# Patient Record
Sex: FEMALE | Race: WHITE
[De-identification: names, ages, dates, MRNs, and addresses within clinical notes are randomized per-mention and may not be internally consistent; named-entity substitution may affect disease eponyms.]

---

## 2017-06-06 ENCOUNTER — HOSPITAL ENCOUNTER (EMERGENCY)
Dept: HOSPITAL 26 - MED | Age: 77
Discharge: HOME | End: 2017-06-06
Payer: COMMERCIAL

## 2017-06-06 VITALS — DIASTOLIC BLOOD PRESSURE: 58 MMHG | SYSTOLIC BLOOD PRESSURE: 158 MMHG

## 2017-06-06 VITALS — DIASTOLIC BLOOD PRESSURE: 75 MMHG | SYSTOLIC BLOOD PRESSURE: 155 MMHG

## 2017-06-06 VITALS — WEIGHT: 125 LBS | BODY MASS INDEX: 24.54 KG/M2 | HEIGHT: 60 IN

## 2017-06-06 DIAGNOSIS — I10: ICD-10-CM
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
## 2017-06-06 DIAGNOSIS — S81.852A: Primary | ICD-10-CM
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
## 2017-06-06 DIAGNOSIS — M19.90: ICD-10-CM
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
## 2017-06-06 NOTE — NUR
PT PRESENTS TO ER FOR EVALUATION OF DOG BITE TO LEFT CALF. HX HTN, 
HYPERLIPIDEMIA, ARTHRITIS.DENIES N/V/D; SKIN IS PINK/WARM/DRY; AAOX4 WITH EVEN 
AND STEADY GAIT; LUNGS CLEAR BL; HR EVEN AND REGULAR; PT DENIES ANY FEVER, CP, 
SOB, OR COUGH AT THIS TIME; PATIENT STATES PAIN OF 7/10 AT THIS TIME;PATIENT 
POSITIONED FOR COMFORT; HOB ELEVATED; BEDRAILS UP X2; BED DOWN.

## 2017-06-06 NOTE — NUR
Patient discharged with v/s stable. Written and verbal after care instructions 
given and explained.Patient alert, oriented and verbalized understanding of 
instructions. Ambulatory with steady gait. All questions addressed prior to 
discharge. ID band removed. Patient advised to follow up with PMD. Rx of 
AUGMENTIN given. Patient educated on indication of medication including 
possible reaction and side effects. Opportunity to ask questions provided and 
answered.ADVISED PT TO MONITOR FOR INFXN E.G INCREASING PAIN,REDNESS/DISCAHRGES 
COMING OUT FROM WOUND.

## 2018-07-28 ENCOUNTER — HOSPITAL ENCOUNTER (INPATIENT)
Dept: HOSPITAL 26 - MED | Age: 78
LOS: 3 days | Discharge: HOME | DRG: 74 | End: 2018-07-31
Attending: FAMILY MEDICINE | Admitting: FAMILY MEDICINE
Payer: COMMERCIAL

## 2018-07-28 VITALS — WEIGHT: 140 LBS | BODY MASS INDEX: 23.9 KG/M2 | HEIGHT: 64 IN

## 2018-07-28 VITALS — SYSTOLIC BLOOD PRESSURE: 122 MMHG | DIASTOLIC BLOOD PRESSURE: 52 MMHG

## 2018-07-28 VITALS — DIASTOLIC BLOOD PRESSURE: 62 MMHG | SYSTOLIC BLOOD PRESSURE: 147 MMHG

## 2018-07-28 VITALS — DIASTOLIC BLOOD PRESSURE: 71 MMHG | SYSTOLIC BLOOD PRESSURE: 178 MMHG
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
## 2018-07-28 DIAGNOSIS — Z90.710: ICD-10-CM

## 2018-07-28 DIAGNOSIS — Z79.899: ICD-10-CM
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
## 2018-07-28 DIAGNOSIS — I11.0: ICD-10-CM

## 2018-07-28 DIAGNOSIS — Z83.3: ICD-10-CM

## 2018-07-28 DIAGNOSIS — K59.09: ICD-10-CM

## 2018-07-28 DIAGNOSIS — Z82.49: ICD-10-CM

## 2018-07-28 DIAGNOSIS — K21.9: ICD-10-CM

## 2018-07-28 DIAGNOSIS — Z93.3: ICD-10-CM

## 2018-07-28 DIAGNOSIS — N39.0: ICD-10-CM

## 2018-07-28 DIAGNOSIS — K72.90: ICD-10-CM

## 2018-07-28 DIAGNOSIS — R32: ICD-10-CM

## 2018-07-28 DIAGNOSIS — K80.20: ICD-10-CM
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
## 2018-07-28 DIAGNOSIS — F32.9: ICD-10-CM
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
## 2018-07-28 DIAGNOSIS — I50.30: ICD-10-CM

## 2018-07-28 DIAGNOSIS — E86.0: ICD-10-CM

## 2018-07-28 DIAGNOSIS — E78.5: ICD-10-CM

## 2018-07-28 DIAGNOSIS — E87.8: ICD-10-CM

## 2018-07-28 DIAGNOSIS — I48.91: ICD-10-CM

## 2018-07-28 DIAGNOSIS — F43.22: ICD-10-CM

## 2018-07-28 DIAGNOSIS — G90.9: Primary | ICD-10-CM

## 2018-07-28 DIAGNOSIS — M19.90: ICD-10-CM

## 2018-07-28 DIAGNOSIS — K59.00: ICD-10-CM

## 2018-07-28 LAB
ALBUMIN FLD-MCNC: 4.3 G/DL (ref 3.4–5)
AMYLASE SERPL-CCNC: 44 U/L (ref 25–115)
ANION GAP SERPL CALCULATED.3IONS-SCNC: 11.8 MMOL/L (ref 8–16)
APPEARANCE UR: CLEAR
AST SERPL-CCNC: 15 U/L (ref 15–37)
BARBITURATES UR QL SCN: (no result) NG/ML
BASOPHILS # BLD AUTO: 0 K/UL (ref 0–0.22)
BASOPHILS NFR BLD AUTO: 0.5 % (ref 0–2)
BENZODIAZ UR QL SCN: (no result) NG/ML
BILIRUB SERPL-MCNC: 0.4 MG/DL (ref 0–1)
BILIRUB UR QL STRIP: NEGATIVE
BUN SERPL-MCNC: 12 MG/DL (ref 7–18)
BZE UR QL SCN: (no result) NG/ML
CANNABINOIDS UR QL SCN: (no result) NG/ML
CHLORIDE SERPL-SCNC: 108 MMOL/L (ref 98–107)
CO2 SERPL-SCNC: 25 MMOL/L (ref 21–32)
COLOR UR: YELLOW
CREAT SERPL-MCNC: 0.9 MG/DL (ref 0.6–1.3)
EOSINOPHIL # BLD AUTO: 0.1 K/UL (ref 0–0.4)
EOSINOPHIL NFR BLD AUTO: 2.3 % (ref 0–4)
ERYTHROCYTE [DISTWIDTH] IN BLOOD BY AUTOMATED COUNT: 14.1 % (ref 11.6–13.7)
GFR SERPL CREATININE-BSD FRML MDRD: (no result) ML/MIN (ref 90–?)
GLUCOSE SERPL-MCNC: 101 MG/DL (ref 74–106)
GLUCOSE UR STRIP-MCNC: NEGATIVE MG/DL
HCT VFR BLD AUTO: 40 % (ref 36–48)
HGB BLD-MCNC: 13.2 G/DL (ref 12–16)
HGB UR QL STRIP: NEGATIVE
LEUKOCYTE ESTERASE UR QL STRIP: (no result)
LIPASE SERPL-CCNC: 79 U/L (ref 73–393)
LYMPHOCYTES # BLD AUTO: 2.4 K/UL (ref 2.5–16.5)
LYMPHOCYTES NFR BLD AUTO: 43.5 % (ref 20.5–51.1)
MAGNESIUM SERPL-MCNC: 2.1 MG/DL (ref 1.8–2.4)
MCH RBC QN AUTO: 30 PG (ref 27–31)
MCHC RBC AUTO-ENTMCNC: 33 G/DL (ref 33–37)
MCV RBC AUTO: 89.7 FL (ref 80–94)
MONOCYTES # BLD AUTO: 0.2 K/UL (ref 0.8–1)
MONOCYTES NFR BLD AUTO: 4.5 % (ref 1.7–9.3)
NEUTROPHILS # BLD AUTO: 2.7 K/UL (ref 1.8–7.7)
NEUTROPHILS NFR BLD AUTO: 49.2 % (ref 42.2–75.2)
NITRITE UR QL STRIP: POSITIVE
OPIATES UR QL SCN: (no result) NG/ML
PCP UR QL SCN: (no result) NG/ML
PH UR STRIP: 6 [PH] (ref 5–9)
PHOSPHATE SERPL-MCNC: 3.4 MG/DL (ref 2.5–4.9)
PLATELET # BLD AUTO: 228 K/UL (ref 140–450)
POTASSIUM SERPL-SCNC: 3.8 MMOL/L (ref 3.5–5.1)
PROTHROMBIN TIME: 10.5 SECS (ref 10.8–13.4)
RBC # BLD AUTO: 4.46 MIL/UL (ref 4.2–5.4)
RBC #/AREA URNS HPF: (no result) /HPF (ref 0–5)
SODIUM SERPL-SCNC: 141 MMOL/L (ref 136–145)
TSH SERPL DL<=0.05 MIU/L-ACNC: 1.17 UIU/ML (ref 0.34–3.74)
WBC # BLD AUTO: 5.5 K/UL (ref 4.8–10.8)

## 2018-07-28 RX ADMIN — Medication SCH MG: at 21:00

## 2018-07-28 RX ADMIN — SODIUM CHLORIDE SCH MLS/HR: 9 INJECTION, SOLUTION INTRAVENOUS at 11:55

## 2018-07-28 RX ADMIN — TOLTERODINE TARTRATE SCH MG: 4 CAPSULE, EXTENDED RELEASE ORAL at 21:01

## 2018-07-28 RX ADMIN — Medication SCH EACH: at 17:17

## 2018-07-28 NOTE — NUR
RECEIVED REPORT FROM ER NURSE. PT ADMITTED WITH DX OF DIZZINESS. PT AOX4. East Timorese SPEAKING 
ONLY. HAS NO SKIN ISSUE. PT HAS THE COLOSTOMY BAG ON HER LLQ. PT IS AMBULATORY. NO SIGN OF 
DISTRESS NOTED. VS NOTED AS T 98.3, HR 62, /62, RR 16, O2 SAT 94:. DAUGHTER AT THE 
BEDSIDE. WILL CONTINUE TO MONITOR PT.

## 2018-07-28 NOTE — NUR
C/O ROOM SPINNING UPON RISING OR SUDDEN MOVEMENTS WITH NAUSEA-----

RIGHT TEMPORAL HEADACHE----DENIES INJURY, FULL CLEAR SPEECH, AMBULATORY WITH 
STEADY GAIT, NO FACIAL ASYMMETRY, TONGUE MIDLINE, NO DRIFT NOTED. DENIES N/V/D; 
SKIN IS PINK/WARM/DRY; AAOX4 WITH EVEN AND STEADY GAIT; LUNGS CLEAR BL; HR EVEN 
AND REGULAR; PT DENIES ANY FEVER, CP AT THIS TIME; PATIENT STATES PAIN OF 3/10 
AT THIS TIME;hooked pt on monitor shows SB-SR, /74, ROOM AIR, O2 SATS 98% 
PATIENT POSITIONED FOR COMFORT; HOB ELEVATED; BEDRAILS UP X2; BED DOWN. 
DAUGHTER AT BEDSIDE. ER MD AT BEDSIDE TO ASSESS PT.

## 2018-07-28 NOTE — NUR
CHECKED ON PT. PT LYING ON BED. INFUSING IVF WELL. NO SIGN OF DISTRESS. DAUGHTER AT THE 
BEDSIDE. WILL CONTINUE TO MONITOR PT.

## 2018-07-28 NOTE — NUR
RECEIVED REPORT FROM DAY SHIFT RN FOR CONTINUITY OF CARE. PT IS A/OX4, ON ROOM AIR, French 
SPEAKING. PT IS ABLE TO MAKE NEEDS KNOWN, ABLE TO FOLLOW COMMANDS. RESPIRATIONS EVEN AND 
UNLABORED AT THE MOMENT. PT SKIN IS INTACT. PT HAS 20G IV TO LEFT AC, ASYMPTOMATIC, INTACT 
AND PATENT. DISCUSSED PLAN OF CARE WITH PT, PT VERBALIZED UNDERSTANDING. VITAL SIGNS WITHIN 
NORMAL LIMITS. PT STABLE, NO SIGNS OF DISTRESS NOTED AT THIS TIME. BED IN LOWEST POSITION, 
BED ALARM ON. CALL LIGHT WITHIN REACH, WILL CONTINUE TO MONITOR.

## 2018-07-28 NOTE — NUR
RECEIVED REPORT FROM ER NURSE YUMIKO. PT STABLE AT THIS TIME. VS SIGN NOTED AS T 98.3, BP 
147/62, O2 SAT 94%, HR 62, RR 16. DENIES PAIN AT THIS TIME. MRSA NARES SPECIMEN COLLECTED 
AND SENT IT TO LAB. DAUGHTER AT BEDSIDE. PT Georgian SPEAKING ONLY. ADMITTED WITH DX OF 
DIZZINESS. NO SIGN OF DISTRESS NOTED. PT IS AMBULATORY. HAS COLOSTOMY BAG AT [facility]. WILL 
CONTINUE OT MONITOR THE PT.

## 2018-07-28 NOTE — NUR
pt transferred to [facility] 121 b by landy, report given to florecita means. pt ambulated 
to bed from [facility].

## 2018-07-28 NOTE — NUR
PT WITH DR LEONARDO, PSYCHOLOGIST. DAUGHTER TA THE BEDSIDE. DOING PSYCH EVALUATION ON THE PT. 
ADMINISTERED MEDS AS ORDERED TO PT.NO SIGN OF DISTRESS. WILL  CONTINUE TO MONITOR PT.

## 2018-07-29 VITALS — DIASTOLIC BLOOD PRESSURE: 51 MMHG | SYSTOLIC BLOOD PRESSURE: 126 MMHG

## 2018-07-29 VITALS — SYSTOLIC BLOOD PRESSURE: 145 MMHG | DIASTOLIC BLOOD PRESSURE: 57 MMHG

## 2018-07-29 VITALS — DIASTOLIC BLOOD PRESSURE: 57 MMHG | SYSTOLIC BLOOD PRESSURE: 120 MMHG

## 2018-07-29 VITALS — SYSTOLIC BLOOD PRESSURE: 126 MMHG | DIASTOLIC BLOOD PRESSURE: 55 MMHG

## 2018-07-29 VITALS — SYSTOLIC BLOOD PRESSURE: 127 MMHG | DIASTOLIC BLOOD PRESSURE: 57 MMHG

## 2018-07-29 VITALS — DIASTOLIC BLOOD PRESSURE: 45 MMHG | SYSTOLIC BLOOD PRESSURE: 134 MMHG

## 2018-07-29 LAB
ALBUMIN FLD-MCNC: 4.5 G/DL (ref 3.4–5)
ANION GAP SERPL CALCULATED.3IONS-SCNC: 11 MMOL/L (ref 8–16)
AST SERPL-CCNC: 21 U/L (ref 15–37)
BASOPHILS # BLD AUTO: 0 K/UL (ref 0–0.22)
BASOPHILS NFR BLD AUTO: 0.5 % (ref 0–2)
BILIRUB DIRECT SERPL-MCNC: 0.1 MG/DL (ref 0–0.3)
BILIRUB SERPL-MCNC: 0.4 MG/DL (ref 0–1)
BUN SERPL-MCNC: 14 MG/DL (ref 7–18)
CHLORIDE SERPL-SCNC: 109 MMOL/L (ref 98–107)
CHOLEST/HDLC SERPL: 4.7 {RATIO} (ref 1–4.5)
CO2 SERPL-SCNC: 28.4 MMOL/L (ref 21–32)
CREAT SERPL-MCNC: 0.9 MG/DL (ref 0.6–1.3)
EOSINOPHIL # BLD AUTO: 0.2 K/UL (ref 0–0.4)
EOSINOPHIL NFR BLD AUTO: 3.4 % (ref 0–4)
ERYTHROCYTE [DISTWIDTH] IN BLOOD BY AUTOMATED COUNT: 13.8 % (ref 11.6–13.7)
GFR SERPL CREATININE-BSD FRML MDRD: (no result) ML/MIN (ref 90–?)
GLUCOSE SERPL-MCNC: 92 MG/DL (ref 74–106)
HCT VFR BLD AUTO: 37.9 % (ref 36–48)
HDLC SERPL-MCNC: 42 MG/DL (ref 40–60)
HGB BLD-MCNC: 12.6 G/DL (ref 12–16)
LDLC SERPL CALC-MCNC: 115 MG/DL (ref 60–100)
LYMPHOCYTES # BLD AUTO: 2.4 K/UL (ref 2.5–16.5)
LYMPHOCYTES NFR BLD AUTO: 43.9 % (ref 20.5–51.1)
MAGNESIUM SERPL-MCNC: 2.1 MG/DL (ref 1.8–2.4)
MCH RBC QN AUTO: 30 PG (ref 27–31)
MCHC RBC AUTO-ENTMCNC: 33 G/DL (ref 33–37)
MCV RBC AUTO: 90.3 FL (ref 80–94)
MONOCYTES # BLD AUTO: 0.3 K/UL (ref 0.8–1)
MONOCYTES NFR BLD AUTO: 6.2 % (ref 1.7–9.3)
NEUTROPHILS # BLD AUTO: 2.6 K/UL (ref 1.8–7.7)
NEUTROPHILS NFR BLD AUTO: 46 % (ref 42.2–75.2)
PHOSPHATE SERPL-MCNC: 4 MG/DL (ref 2.5–4.9)
PLATELET # BLD AUTO: 216 K/UL (ref 140–450)
POTASSIUM SERPL-SCNC: 4.4 MMOL/L (ref 3.5–5.1)
RBC # BLD AUTO: 4.2 MIL/UL (ref 4.2–5.4)
SODIUM SERPL-SCNC: 144 MMOL/L (ref 136–145)
T4 SERPL-MCNC: 8.2 UG/DL (ref 4.5–12)
TRIGL SERPL-MCNC: 214 MG/DL (ref 30–150)
WBC # BLD AUTO: 5.6 K/UL (ref 4.8–10.8)

## 2018-07-29 RX ADMIN — OXYCODONE HYDROCHLORIDE AND ACETAMINOPHEN SCH MG: 500 TABLET ORAL at 08:51

## 2018-07-29 RX ADMIN — Medication SCH MG: at 08:52

## 2018-07-29 RX ADMIN — Medication SCH EACH: at 08:51

## 2018-07-29 RX ADMIN — Medication SCH MG: at 20:44

## 2018-07-29 RX ADMIN — SODIUM CHLORIDE SCH MLS/HR: 9 INJECTION, SOLUTION INTRAVENOUS at 04:35

## 2018-07-29 RX ADMIN — TOLTERODINE TARTRATE SCH MG: 4 CAPSULE, EXTENDED RELEASE ORAL at 20:45

## 2018-07-29 RX ADMIN — SODIUM CHLORIDE SCH MLS/HR: 9 INJECTION, SOLUTION INTRAVENOUS at 21:15

## 2018-07-29 RX ADMIN — PANTOPRAZOLE SODIUM SCH MG: 40 TABLET, DELAYED RELEASE ORAL at 08:52

## 2018-07-29 RX ADMIN — Medication SCH TAB: at 08:55

## 2018-07-29 NOTE — NUR
CHECKED ON PT. PT SLEEPING. FAMILY MEMBERS WITH THE PT. DENIES ANY PAIN, ANY DIZZINESS. NO 
SIGN OF DISTRESS. CALL LIGHT WITHIN REACH. WILL CONTINUE TO MONITOR PT.

## 2018-07-29 NOTE — NUR
ADMINISTERED MEDS TO PT AS ORDERED. PT TOLERATED WELL. DAUGHTER AT THE BEDSIDE. STATES THAT 
PT FELT DIZZY WHILE GOING TO RESTROOM. PT HAD BREAKFAST . PT ORTHOSTATIC VS BEING TAKEN. 
LYING POSITION 138/51, SITTING 124/55 AND STANDING /61. PT STATES NO DIZINESS AT ALL 
POSITION. PT IN STABLE CONDITION. WILL CONTINUE TO MONITOR PT.

## 2018-07-29 NOTE — NUR
CHECKED ON PT. VS NORMAL NOTED. PT WITH FAMILY, EATING LUNCH. NO SIGN OF DISTRESS NOTED. 
DENIES PAIN. CALL LIGHT WITHIN PT REACH. INSTRUCTED TO USE CALL LIGHT FOR ANY NEED. 
VERBALIZED UNDERSTANDING. WILL CONTINUE TO MONITOR PT.

## 2018-07-29 NOTE — NUR
CHECKED ON PT. SITTING ON HER BED. TALKING WITH FAMILY. NO SIGN OF DISTRESS. WILL CONTINUE 
TO MONITOR PT.

## 2018-07-29 NOTE — NUR
CHECKED ON PT. RECORDED HER VS. FAMILY AT THE BEDSIDE. NO SIGN OF DISTRESS.DENIES ANY 
DIZZINESS, ANY PAIN. CALL LIGHT WITHIN PT REACH. BED AT LOWER POSITION. WILL CONTINUE TO 
MONITOR PT.

## 2018-07-29 NOTE — NUR
RECEIVED PATIENT AWAKE LYING ON BED  ACCOMPANIED BY HER DAUGHTER. PATIENT WAS AA0X4, 
AMBULATORY, Bahamian SPEAKING PATIENT. EXPLAINED THE PLAN OF CARE WITH THE HELP OF HER 
DAUGHTER AND VERBALIZED UNDERSTANDING. BED IN LOW LOCKED POSITION. CALL LIGHT WITHIN REACH.

## 2018-07-29 NOTE — NUR
SEEN PATIENT AWAKE LYING ON BED. MEDICATION GIVEN AND TOLERATED WELL. ALL NEEDS ATTENDED. NO 
S/S OF DISTRESS NOTED AT THIS TIME. CALL LIGHT WITHIN REACH. WILL CONTINUE TO MONITOR.

## 2018-07-30 VITALS — DIASTOLIC BLOOD PRESSURE: 46 MMHG | SYSTOLIC BLOOD PRESSURE: 127 MMHG

## 2018-07-30 VITALS — DIASTOLIC BLOOD PRESSURE: 55 MMHG | SYSTOLIC BLOOD PRESSURE: 139 MMHG

## 2018-07-30 VITALS — DIASTOLIC BLOOD PRESSURE: 53 MMHG | SYSTOLIC BLOOD PRESSURE: 126 MMHG

## 2018-07-30 VITALS — SYSTOLIC BLOOD PRESSURE: 131 MMHG | DIASTOLIC BLOOD PRESSURE: 52 MMHG

## 2018-07-30 VITALS — SYSTOLIC BLOOD PRESSURE: 128 MMHG | DIASTOLIC BLOOD PRESSURE: 51 MMHG

## 2018-07-30 VITALS — DIASTOLIC BLOOD PRESSURE: 39 MMHG | SYSTOLIC BLOOD PRESSURE: 115 MMHG

## 2018-07-30 LAB
ANION GAP SERPL CALCULATED.3IONS-SCNC: 14.8 MMOL/L (ref 8–16)
BASOPHILS # BLD AUTO: 0.1 K/UL (ref 0–0.22)
BASOPHILS NFR BLD AUTO: 0.9 % (ref 0–2)
BUN SERPL-MCNC: 12 MG/DL (ref 7–18)
CHLORIDE SERPL-SCNC: 105 MMOL/L (ref 98–107)
CO2 SERPL-SCNC: 25.2 MMOL/L (ref 21–32)
CREAT SERPL-MCNC: 0.9 MG/DL (ref 0.6–1.3)
EOSINOPHIL # BLD AUTO: 0.3 K/UL (ref 0–0.4)
EOSINOPHIL NFR BLD AUTO: 4.8 % (ref 0–4)
ERYTHROCYTE [DISTWIDTH] IN BLOOD BY AUTOMATED COUNT: 14 % (ref 11.6–13.7)
GFR SERPL CREATININE-BSD FRML MDRD: (no result) ML/MIN (ref 90–?)
GLUCOSE SERPL-MCNC: 90 MG/DL (ref 74–106)
HCT VFR BLD AUTO: 37.9 % (ref 36–48)
HGB BLD-MCNC: 12.7 G/DL (ref 12–16)
LYMPHOCYTES # BLD AUTO: 2.3 K/UL (ref 2.5–16.5)
LYMPHOCYTES NFR BLD AUTO: 40.7 % (ref 20.5–51.1)
MAGNESIUM SERPL-MCNC: 2 MG/DL (ref 1.8–2.4)
MCH RBC QN AUTO: 30 PG (ref 27–31)
MCHC RBC AUTO-ENTMCNC: 33 G/DL (ref 33–37)
MCV RBC AUTO: 89.3 FL (ref 80–94)
MONOCYTES # BLD AUTO: 0.3 K/UL (ref 0.8–1)
MONOCYTES NFR BLD AUTO: 5.8 % (ref 1.7–9.3)
NEUTROPHILS # BLD AUTO: 2.7 K/UL (ref 1.8–7.7)
NEUTROPHILS NFR BLD AUTO: 47.8 % (ref 42.2–75.2)
PHOSPHATE SERPL-MCNC: 3.7 MG/DL (ref 2.5–4.9)
PLATELET # BLD AUTO: 220 K/UL (ref 140–450)
POTASSIUM SERPL-SCNC: 4 MMOL/L (ref 3.5–5.1)
RBC # BLD AUTO: 4.24 MIL/UL (ref 4.2–5.4)
SODIUM SERPL-SCNC: 141 MMOL/L (ref 136–145)
WBC # BLD AUTO: 5.7 K/UL (ref 4.8–10.8)

## 2018-07-30 RX ADMIN — Medication SCH EACH: at 09:29

## 2018-07-30 RX ADMIN — PANTOPRAZOLE SODIUM SCH MG: 40 TABLET, DELAYED RELEASE ORAL at 09:30

## 2018-07-30 RX ADMIN — Medication SCH TAB: at 09:30

## 2018-07-30 RX ADMIN — SODIUM CHLORIDE SCH MLS/HR: 9 INJECTION, SOLUTION INTRAVENOUS at 13:55

## 2018-07-30 RX ADMIN — Medication SCH MG: at 21:10

## 2018-07-30 RX ADMIN — Medication SCH MG: at 09:32

## 2018-07-30 RX ADMIN — TOLTERODINE TARTRATE SCH MG: 4 CAPSULE, EXTENDED RELEASE ORAL at 21:09

## 2018-07-30 RX ADMIN — ESCITALOPRAM OXALATE SCH MG: 20 TABLET, FILM COATED ORAL at 09:00

## 2018-07-30 RX ADMIN — OXYCODONE HYDROCHLORIDE AND ACETAMINOPHEN SCH MG: 500 TABLET ORAL at 09:30

## 2018-07-30 NOTE — NUR
RECEIVED PATIENT REPORT AT BEDSIDE. PATIENT AWAKE, ALERT AND ORIENTED. NO S/S OF DISTRESS. 
PATIENT ON ROOM AIR. PATIENT REPORTS OF CONSTIPATION. COLOSTOMY BAG IN PLACE. ABD FIRM AND 
DISTENDED. BOWEL SOUNDS NOTED. WILL ADMINISTER PRN MEDICATION. BED LOWERED WITH CALL LIGHT 
WITHIN REACH. WILL CONTINUE TO MONITOR

## 2018-07-30 NOTE — NUR
RECEIVED BEDSIDE REPORT FROM AYDE HAUSER, PT IN STABLE CONDITION , NO DISTRESS NOTED, CALL 
LIGHT WITHIN REACH, WILL CONTINUE TO MONITOR.

## 2018-07-30 NOTE — NUR
PATIENT AWAKE IN BED. GRAND DAUGHTER AT BEDSIDE. PATIENT REPORTS HAVING A BM AND DENIES ANY 
DISCOMFORT AT THIS TIME. WILL CONTINUE TO MONITOR

## 2018-07-30 NOTE — NUR
V/S TAKEN AND RECORDED. V/S WITHIN NORMAL RANGE. PATIENT BACK TO SLEEP . BED IN LOW LOCKED 
POSITION. CALL LIGHT WITHIN REACH. WILL CONTINUE TO MONITOR.

## 2018-07-30 NOTE — NUR
ENDORSED PT TO DAY SHIFT NURSE WILFRIDO RN, CALL LIGHT WITHIN REACH, PT IN STABLE CONDITION, 
NO DISTRESS NOTED.

## 2018-07-30 NOTE — NUR
PATIENT HAS BEEN SCREENED AND CATEGORIZED AS MODERATE NUTRITION RISK. PATIENT WILL BE SEEN 
WITHIN 3-5 DAYS OF ADMISSION.



7/30/18 - 8/1/18



[address]IS [address] RD

## 2018-07-30 NOTE — NUR
RECEIVED PT FROM WILFRIDO RN PT Maltese SPEAKER AAOX4 AMBULATORY ON TELEMETRY SR HL ON LEFT 
FA PATENT  RELATIVES AT BED SIDE INITIAL ASSESSMENT DONE

## 2018-07-31 VITALS — SYSTOLIC BLOOD PRESSURE: 108 MMHG | DIASTOLIC BLOOD PRESSURE: 45 MMHG

## 2018-07-31 VITALS — DIASTOLIC BLOOD PRESSURE: 48 MMHG | SYSTOLIC BLOOD PRESSURE: 122 MMHG

## 2018-07-31 VITALS — DIASTOLIC BLOOD PRESSURE: 47 MMHG | SYSTOLIC BLOOD PRESSURE: 128 MMHG

## 2018-07-31 LAB
ANION GAP SERPL CALCULATED.3IONS-SCNC: 13.5 MMOL/L (ref 8–16)
BASOPHILS # BLD AUTO: 0 K/UL (ref 0–0.22)
BASOPHILS NFR BLD AUTO: 0.6 % (ref 0–2)
BUN SERPL-MCNC: 10 MG/DL (ref 7–18)
CHLORIDE SERPL-SCNC: 106 MMOL/L (ref 98–107)
CO2 SERPL-SCNC: 27 MMOL/L (ref 21–32)
CREAT SERPL-MCNC: 1.1 MG/DL (ref 0.6–1.3)
EOSINOPHIL # BLD AUTO: 0.3 K/UL (ref 0–0.4)
EOSINOPHIL NFR BLD AUTO: 5.4 % (ref 0–4)
ERYTHROCYTE [DISTWIDTH] IN BLOOD BY AUTOMATED COUNT: 14.1 % (ref 11.6–13.7)
GFR SERPL CREATININE-BSD FRML MDRD: (no result) ML/MIN (ref 90–?)
GLUCOSE SERPL-MCNC: 91 MG/DL (ref 74–106)
HCT VFR BLD AUTO: 36.7 % (ref 36–48)
HGB BLD-MCNC: 12.6 G/DL (ref 12–16)
LYMPHOCYTES # BLD AUTO: 2.6 K/UL (ref 2.5–16.5)
LYMPHOCYTES NFR BLD AUTO: 45.2 % (ref 20.5–51.1)
MCH RBC QN AUTO: 31 PG (ref 27–31)
MCHC RBC AUTO-ENTMCNC: 34 G/DL (ref 33–37)
MCV RBC AUTO: 89 FL (ref 80–94)
MONOCYTES # BLD AUTO: 0.3 K/UL (ref 0.8–1)
MONOCYTES NFR BLD AUTO: 6 % (ref 1.7–9.3)
NEUTROPHILS # BLD AUTO: 2.4 K/UL (ref 1.8–7.7)
NEUTROPHILS NFR BLD AUTO: 42.8 % (ref 42.2–75.2)
PLATELET # BLD AUTO: 230 K/UL (ref 140–450)
POTASSIUM SERPL-SCNC: 4.5 MMOL/L (ref 3.5–5.1)
RBC # BLD AUTO: 4.12 MIL/UL (ref 4.2–5.4)
SODIUM SERPL-SCNC: 142 MMOL/L (ref 136–145)
WBC # BLD AUTO: 5.7 K/UL (ref 4.8–10.8)

## 2018-07-31 RX ADMIN — ESCITALOPRAM OXALATE SCH MG: 20 TABLET, FILM COATED ORAL at 08:57

## 2018-07-31 RX ADMIN — Medication SCH TAB: at 08:49

## 2018-07-31 RX ADMIN — Medication SCH MG: at 08:57

## 2018-07-31 RX ADMIN — Medication SCH EACH: at 08:49

## 2018-07-31 RX ADMIN — OXYCODONE HYDROCHLORIDE AND ACETAMINOPHEN SCH MG: 500 TABLET ORAL at 08:49

## 2018-07-31 RX ADMIN — PANTOPRAZOLE SODIUM SCH MG: 40 TABLET, DELAYED RELEASE ORAL at 08:49

## 2018-07-31 NOTE — NUR
PATIENT DISCHARGED TO HOME. DISCHARGE INSTRUCTIONS AND DISCHARGE PRESCRIPTIONS GIVEN. 
PATIENT VERBALIZED UNDERSTANDING. IV LINE DISCONTINUED, TELE LEADS TAKEN OFF. PATIENT LEFT 
WITH ALL HER BELONGINGS AND DISCHARGE PAPERS. PATIENT PICKED UP BY HER GRAND CHILDREN. 
PATIENT LEFT IN STABLE CONDITION

## 2018-07-31 NOTE — NUR
RECEIVED PATIENT AWAKE IN BED. PATIENT ALERT AND ORIENTED. NO S/S OF DISTRESS. PATIENT 
DENIES ANY PAIN OR DISCOMFORT AT THIS TIME

## 2018-07-31 NOTE — NUR
SPONGE BATH GIVEN LINEN CHANGED PT AMBULATORY VOIDING WELL COLOSTOMY BAG FULL OF AIR ON 
TELEMETRY SR

## 2023-02-16 ENCOUNTER — HOSPITAL ENCOUNTER (INPATIENT)
Dept: HOSPITAL 26 - MED | Age: 83
LOS: 9 days | Discharge: HOME | DRG: 641 | End: 2023-02-25
Attending: STUDENT IN AN ORGANIZED HEALTH CARE EDUCATION/TRAINING PROGRAM | Admitting: STUDENT IN AN ORGANIZED HEALTH CARE EDUCATION/TRAINING PROGRAM
Payer: COMMERCIAL

## 2023-02-16 VITALS — SYSTOLIC BLOOD PRESSURE: 130 MMHG | DIASTOLIC BLOOD PRESSURE: 61 MMHG

## 2023-02-16 VITALS — BODY MASS INDEX: 15.46 KG/M2 | HEIGHT: 62 IN | WEIGHT: 84 LBS

## 2023-02-16 DIAGNOSIS — D37.8: ICD-10-CM

## 2023-02-16 DIAGNOSIS — K56.609: ICD-10-CM

## 2023-02-16 DIAGNOSIS — I24.8: ICD-10-CM

## 2023-02-16 DIAGNOSIS — Z20.822: ICD-10-CM

## 2023-02-16 DIAGNOSIS — I10: ICD-10-CM

## 2023-02-16 DIAGNOSIS — J90: ICD-10-CM

## 2023-02-16 DIAGNOSIS — I95.9: ICD-10-CM

## 2023-02-16 DIAGNOSIS — K29.70: ICD-10-CM

## 2023-02-16 DIAGNOSIS — R63.4: ICD-10-CM

## 2023-02-16 DIAGNOSIS — R77.8: ICD-10-CM

## 2023-02-16 DIAGNOSIS — K43.5: ICD-10-CM

## 2023-02-16 DIAGNOSIS — N39.0: ICD-10-CM

## 2023-02-16 DIAGNOSIS — J98.11: ICD-10-CM

## 2023-02-16 DIAGNOSIS — E87.6: ICD-10-CM

## 2023-02-16 DIAGNOSIS — K56.7: ICD-10-CM

## 2023-02-16 DIAGNOSIS — E83.51: ICD-10-CM

## 2023-02-16 DIAGNOSIS — Z79.82: ICD-10-CM

## 2023-02-16 DIAGNOSIS — E44.1: ICD-10-CM

## 2023-02-16 DIAGNOSIS — R60.1: ICD-10-CM

## 2023-02-16 DIAGNOSIS — Z93.3: ICD-10-CM

## 2023-02-16 DIAGNOSIS — E16.2: Primary | ICD-10-CM

## 2023-02-16 DIAGNOSIS — I31.39: ICD-10-CM

## 2023-02-16 DIAGNOSIS — K63.5: ICD-10-CM

## 2023-02-16 LAB
ALBUMIN FLD-MCNC: 3 G/DL (ref 3.4–5)
AMYLASE SERPL-CCNC: 18 U/L (ref 25–115)
ANION GAP SERPL CALCULATED.3IONS-SCNC: 13.5 MMOL/L (ref 8–16)
APPEARANCE UR: CLEAR
AST SERPL-CCNC: 16 U/L (ref 15–37)
BASOPHILS # BLD AUTO: 0 K/UL (ref 0–0.22)
BASOPHILS NFR BLD AUTO: 0.5 % (ref 0–2)
BILIRUB SERPL-MCNC: 0.3 MG/DL (ref 0–1)
BILIRUB UR QL STRIP: NEGATIVE
BUN SERPL-MCNC: 19 MG/DL (ref 7–18)
CHLORIDE SERPL-SCNC: 100 MMOL/L (ref 98–107)
CO2 SERPL-SCNC: 23.3 MMOL/L (ref 21–32)
COLOR UR: YELLOW
CREAT SERPL-MCNC: 0.7 MG/DL (ref 0.6–1.3)
EOSINOPHIL # BLD AUTO: 0 K/UL (ref 0–0.4)
EOSINOPHIL NFR BLD AUTO: 0.4 % (ref 0–4)
ERYTHROCYTE [DISTWIDTH] IN BLOOD BY AUTOMATED COUNT: 16 % (ref 11.6–13.7)
GFR SERPL CREATININE-BSD FRML MDRD: (no result) ML/MIN (ref 90–?)
GLUCOSE SERPL-MCNC: 330 MG/DL (ref 74–106)
GLUCOSE UR STRIP-MCNC: (no result) MG/DL
HCT VFR BLD AUTO: 32.6 % (ref 36–48)
HGB BLD-MCNC: 10.8 G/DL (ref 12–16)
HGB UR QL STRIP: NEGATIVE
LEUKOCYTE ESTERASE UR QL STRIP: (no result)
LIPASE SERPL-CCNC: 66 U/L (ref 73–393)
LYMPHOCYTES # BLD AUTO: 3.1 K/UL (ref 2.5–16.5)
LYMPHOCYTES NFR BLD AUTO: 31.6 % (ref 20.5–51.1)
MCH RBC QN AUTO: 30 PG (ref 27–31)
MCHC RBC AUTO-ENTMCNC: 33 G/DL (ref 33–37)
MCV RBC AUTO: 92.2 FL (ref 80–94)
MONOCYTES # BLD AUTO: 0.4 K/UL (ref 0.8–1)
MONOCYTES NFR BLD AUTO: 4.3 % (ref 1.7–9.3)
NEUTROPHILS # BLD AUTO: 6.3 K/UL (ref 1.8–7.7)
NEUTROPHILS NFR BLD AUTO: 63.2 % (ref 42.2–75.2)
NITRITE UR QL STRIP: POSITIVE
PH UR STRIP: 6 [PH] (ref 5–9)
PLATELET # BLD AUTO: 394 K/UL (ref 140–450)
POTASSIUM SERPL-SCNC: 3.8 MMOL/L (ref 3.5–5.1)
RBC # BLD AUTO: 3.54 MIL/UL (ref 4.2–5.4)
RBC #/AREA URNS HPF: (no result) /HPF (ref 0–5)
SODIUM SERPL-SCNC: 133 MMOL/L (ref 136–145)
WBC # BLD AUTO: 9.9 K/UL (ref 4.8–10.8)
WBC,URINE: (no result) /HPF (ref 0–5)

## 2023-02-16 PROCEDURE — C9113 INJ PANTOPRAZOLE SODIUM, VIA: HCPCS

## 2023-02-16 PROCEDURE — A9153 MULTI-VITAMIN NOS: HCPCS

## 2023-02-16 RX ADMIN — SODIUM CHLORIDE SCH MLS/HR: 9 INJECTION, SOLUTION INTRAVENOUS at 22:46

## 2023-02-16 RX ADMIN — Medication SCH DEV: at 16:34

## 2023-02-16 RX ADMIN — Medication SCH DEV: at 20:23

## 2023-02-16 RX ADMIN — SODIUM CHLORIDE SCH MLS/HR: 9 INJECTION, SOLUTION INTRAVENOUS at 23:26

## 2023-02-16 NOTE — NUR
NOTIFIED DR. HOLLIS THAT PT BLOOD GLUCOSE IS 99 STILL LOW. SHE ORDER ANOTHER AMP OF D50 AND 
INCREASE D5NS . ORDER WAS MADE AND CARRIED OUT.

## 2023-02-16 NOTE — NUR
DR. HOLLIS ORDER D50 X1 FOR THE PT DUE TO PT'S BLOOD GLUCOSE OF 37, SHE ALSO ADDED GIVE IL NS 
BOLUS TO PT DUE BP OF 93/44 AND INCREASED D5NS AT 75ML/HR. ORDER WAS MADE AND CARRIED OUT.

## 2023-02-16 NOTE — NUR
PT WAS ADMITTEDTO Psychiatric FROM ER THRU [facility] WITH DIAGNOSIS OF HYPOGLYCEMIA. PT IS AOX4, 
Lithuanian SPEAKING, ON BEDREST, ABLE TO VERBALIZE NEEDS AND ABLE TO FOLLOW COMMANDS. PT IS ON 
ROOM AIR AND ON CARDIAC DIET. PT HAS COLOSTOMY AND HAS RIGHT UPPER ARM PICC LINE DOUBLE 
LUMEN RUNNING AT D5NS AT 50 ML/HR. PT SKIN IS INTACT. NO COMPLAIN OF PAIN AT THIS TIME. NO 
S/S OF RESPIRATORY DISTRESS NOTED. PT WAS ORIENTED TO ROOM/HOSPITAL, BED BUTTON, AND CALL 
LIGHT. ALL SAFETY MEASURES IMPLEMENTED. BED IN LOW POSITION, BED WHEELS ON LOCK AND CALL 
LIGHT WITHIN REACH.

## 2023-02-16 NOTE — NUR
Patient will be admitted to care of Telemetry Nurse Ida. Admited to 
Telemetry.  Will go to room 105B. Belongings list completed.  Report to 
Telemetry Nurse Ida. Telemetry Nurse Ida verbalized understanding of 
report, no further questions.

## 2023-02-16 NOTE — NUR
82/F WALKED IN ACCOMPANIED BY DAUGHTER C/O GEN WEAKNESS ONSET TODAY AND LOW BS 
AT HOME OF 30 POST BREAKFAST. PT BS 13 AT TRIAGE. ERMD MADE AWARE. PT DENIES HX 
DM OR ANY INSULIN INTAKE. PRESENTS WITH COLOSTOMY BAG TO Q. PT DAUGHTER 
REPORTS PT ABLE TO TOLERATE PO. LAST MEAL WAS THIS AM. PT DAUGHTER REPORTS PT 
WAS HOSPITALIZED 1 MONTH AGO D/T UNABLE TO TOLERATE PO.AAO4 , AMBULATORY. ON 
MONITOR, IV ESTABLISHED TO RIGHT AC WITH 20G.



PMH: HTN

MED: ASA, OMEPRAZOLE

## 2023-02-17 VITALS — SYSTOLIC BLOOD PRESSURE: 121 MMHG | DIASTOLIC BLOOD PRESSURE: 40 MMHG

## 2023-02-17 VITALS — SYSTOLIC BLOOD PRESSURE: 116 MMHG | DIASTOLIC BLOOD PRESSURE: 53 MMHG

## 2023-02-17 VITALS — DIASTOLIC BLOOD PRESSURE: 42 MMHG | SYSTOLIC BLOOD PRESSURE: 97 MMHG

## 2023-02-17 LAB
AMYLASE SERPL-CCNC: 25 U/L (ref 25–115)
ANION GAP SERPL CALCULATED.3IONS-SCNC: 13.7 MMOL/L (ref 8–16)
BASOPHILS # BLD AUTO: 0.1 K/UL (ref 0–0.22)
BASOPHILS NFR BLD AUTO: 0.3 % (ref 0–2)
BUN SERPL-MCNC: 11 MG/DL (ref 7–18)
CHLORIDE SERPL-SCNC: 107 MMOL/L (ref 98–107)
CHOLEST/HDLC SERPL: 1.4 {RATIO} (ref 1–4.5)
CO2 SERPL-SCNC: 23.7 MMOL/L (ref 21–32)
CREAT SERPL-MCNC: 0.7 MG/DL (ref 0.6–1.3)
EOSINOPHIL # BLD AUTO: 0 K/UL (ref 0–0.4)
EOSINOPHIL NFR BLD AUTO: 0.1 % (ref 0–4)
ERYTHROCYTE [DISTWIDTH] IN BLOOD BY AUTOMATED COUNT: 16.1 % (ref 11.6–13.7)
GFR SERPL CREATININE-BSD FRML MDRD: (no result) ML/MIN (ref 90–?)
GLUCOSE SERPL-MCNC: 104 MG/DL (ref 74–106)
HCT VFR BLD AUTO: 41.2 % (ref 36–48)
HDLC SERPL-MCNC: 66 MG/DL (ref 40–60)
HGB BLD-MCNC: 13.5 G/DL (ref 12–16)
LDLC SERPL CALC-MCNC: 21 MG/DL (ref 60–100)
LIPASE SERPL-CCNC: 76 U/L (ref 73–393)
LYMPHOCYTES # BLD AUTO: 2.5 K/UL (ref 2.5–16.5)
LYMPHOCYTES NFR BLD AUTO: 14.7 % (ref 20.5–51.1)
MAGNESIUM SERPL-MCNC: 1.6 MG/DL (ref 1.8–2.4)
MCH RBC QN AUTO: 30 PG (ref 27–31)
MCHC RBC AUTO-ENTMCNC: 33 G/DL (ref 33–37)
MCV RBC AUTO: 92.1 FL (ref 80–94)
MONOCYTES # BLD AUTO: 0.8 K/UL (ref 0.8–1)
MONOCYTES NFR BLD AUTO: 4.6 % (ref 1.7–9.3)
NEUTROPHILS # BLD AUTO: 13.7 K/UL (ref 1.8–7.7)
NEUTROPHILS NFR BLD AUTO: 80.3 % (ref 42.2–75.2)
PHOSPHATE SERPL-MCNC: 3 MG/DL (ref 2.5–4.9)
PLATELET # BLD AUTO: 413 K/UL (ref 140–450)
POTASSIUM SERPL-SCNC: 3.4 MMOL/L (ref 3.5–5.1)
PROTHROMBIN TIME: 12.1 SECS (ref 10.8–13.4)
RBC # BLD AUTO: 4.48 MIL/UL (ref 4.2–5.4)
SODIUM SERPL-SCNC: 141 MMOL/L (ref 136–145)
T4 FREE SERPL-MCNC: 1.21 NG/DL (ref 0.76–1.46)
TRIGL SERPL-MCNC: 27 MG/DL (ref 30–150)
TSH SERPL DL<=0.05 MIU/L-ACNC: 2.73 UIU/ML (ref 0.34–3.74)
WBC # BLD AUTO: 17.1 K/UL (ref 4.8–10.8)

## 2023-02-17 RX ADMIN — TOLTERODINE TARTRATE SCH MG: 4 CAPSULE, EXTENDED RELEASE ORAL at 20:38

## 2023-02-17 RX ADMIN — Medication SCH DEV: at 16:28

## 2023-02-17 RX ADMIN — DEXTROSE AND SODIUM CHLORIDE SCH MLS/HR: 5; .9 INJECTION, SOLUTION INTRAVENOUS at 17:25

## 2023-02-17 RX ADMIN — Medication SCH DEV: at 12:04

## 2023-02-17 RX ADMIN — POTASSIUM CHLORIDE PRN MEQ: 750 TABLET, FILM COATED, EXTENDED RELEASE ORAL at 11:02

## 2023-02-17 RX ADMIN — Medication SCH DEV: at 06:31

## 2023-02-17 RX ADMIN — DEXTROSE AND SODIUM CHLORIDE SCH MLS/HR: 5; .9 INJECTION, SOLUTION INTRAVENOUS at 00:19

## 2023-02-17 RX ADMIN — SODIUM CHLORIDE SCH MLS/HR: 9 INJECTION, SOLUTION INTRAVENOUS at 00:27

## 2023-02-17 RX ADMIN — Medication SCH DEV: at 20:44

## 2023-02-17 RX ADMIN — DEXTROSE AND SODIUM CHLORIDE SCH MLS/HR: 5; .9 INJECTION, SOLUTION INTRAVENOUS at 04:54

## 2023-02-17 RX ADMIN — DEXTROSE AND SODIUM CHLORIDE SCH MLS/HR: 5; .9 INJECTION, SOLUTION INTRAVENOUS at 10:45

## 2023-02-17 RX ADMIN — DOCUSATE SODIUM SCH MG: 100 CAPSULE, LIQUID FILLED ORAL at 20:37

## 2023-02-17 RX ADMIN — ATORVASTATIN CALCIUM SCH MG: 20 TABLET, FILM COATED ORAL at 20:37

## 2023-02-17 NOTE — NUR
2/17/23 RD INITIAL ASSESSMENT COMPLETED



PLEASE REFER TO NUTRITION ASSESSMENT UNDER CARE ACTIVITY FOR ESTIMATED NUTRITIONAL NEEDS. 



1. CONTINUE CARDIAC DIET, AS TOLERATED 

2. RECOMMEND ENSURE BID.

- ENSURE BID PROVIDES 700 KCALS, 40 G PROTEIN

3. MONITOR PO INTAKE, GI, LAB VALUES, WEIGHTS. 

4. RD TO FOLLOW-UP 2-3 DAYS, HIGH RISK 



REVIEWED BY ANGUS CASEY RD

## 2023-02-17 NOTE — NUR
DC PLANNING 



*** ASSESSMENT COMPLETE***



SEE ASSESSMENT FOR DETAILS 



TENTATIVE DC PLAN IS FOR PT TO RETURN HOME WITH HER DAUGHTER EDNA OR FAMLY 

PROVIDING TRANSPORTATION, ONCE MEDICALLY STABLE. 

-------------------------------------------------------------------------------

Addendum: 02/21/23 at 0923 by Adriana FLOWER

-------------------------------------------------------------------------------

Amended: Links added.

## 2023-02-17 NOTE — NUR
MORNING CARE WAS DONE TO PT. CHANGED CHUCKS, LINENS AND EMPTY COLOSTOMY BAG. ALL SAFETY 
MEASURES IMPLEMENTED. BED IN LOW POSITION, BED WHEELS ON LOCKED AND CALL LIGHT WITHIN REACH.

## 2023-02-17 NOTE — NUR
RECEIVED PT FROM NIGHT SHIFT NURSE FOR CONTINUITY OF CARE. PT IN BED AWAKE AND ALERT X4. 
ABLE TO VERBALIZE NEEDS. RESPIRATIONS EVEN AND UNLABORED ON RA. SKIN WARM AND DRY TO TOUCH. 
NO DISTRESS NOTED. CALL LIGHT WITHIN REACH. ALL SAFETY PRECAUTIONS IN PLACE.

## 2023-02-17 NOTE — NUR
PT. WITH LOW EVGENY SCALE AT MODERATE TO HIGH RISK, CONTINUE TO FOLLOW PRESSURE INJURY 
PREVENTION INTERVENTIONS.

-POSITIONING:

TURN AND REPOSITION PATIENT Q 2H OR SOONER

USE PILLOWS TO KEEP BONY PROMINENCES FROM DIRECT CONTACT WITH SURFACES

USE REPOSITIONING WEDGES TO PROVIDE 30-DEGREE ANGLE FOR SIDE LYING POSITIONS

OFFLOADING OR FOAM DRESSING TO ALL TUBING TO PREVENT MEDICAL DEVICES RELATED PRESSURE INJURY

-RE-EVALUATING AND MANAGING INCONTINENCE

MONITOR SKIN CONDITION DURING POSITION CHANGE

DO NOT MASSAGE REDNESS, BONY PROMINENCES

FREQUENT DECLAN-CARE AND PROVIDE BARRIER CREAMS PRN IF SOILING

MOISTURE CONTROL BY OFFER BED PAN/URINAL /ABSORBENT PAD TO WICK AND HOLD MOISTURE

KEEP SKIN DRY AND PROTECT FROM FRICTION

-MANAGE FRICTION/SHEAR/MOBILITY

KEEP HOB AT THE LOWEST LEVEL OF ELEVATION NO MORE THAN 30 DEGREE UNLESS OTHERWISE 
CONTRAINDICATED

USE LIFT SHEET OR TRANSFER DEVICE TO MOVE PATIENT AND PREVENT LATERAL SHEER.

PROTECT HEELS, ELBOWS BONY PROMINENCES WITH SKIN BERRIES OR FOAM DRESSING IF EXPOSED TO 
FRICTION

OFFLOAD BILATERAL HEELS BY PLACING PILLOWS UNDER CALVES AT ALL TIMES, UNLESS OTHERWISE 
CONTRAINDICATED

-PRESSURE REDISTRIBUTION SURFACE THERAPY TANIKA ISOFLEX MATTRESS

-NUTRITION:

PLEASE FOLLOW RD RECOMMENDATIONS AND OFFER NUTRITION SUPPLEMENTS IF ORDERED.

PLEASE CONTACT WOUND CARE NURSE FOR ANY QUESTION AND CHANGE OF WOUND CONDITION.

## 2023-02-17 NOTE — NUR
PT IS SLEEPING. CHEST RISE AND FALL SYMMETRICALLY NOTED. RESPIRATION IS EVEN AND UNLABORED. 
ALL SAFETY MEASURES IMPLEMENTED. BED IN LOW POSITION, BED WHEELS ON LOCK AND CALL LIGHT 
WITHIN REACH.

## 2023-02-17 NOTE — NUR
PATIENT HAS BEEN SCREENED AND CATEGORIZED AS HIGH NUTRITION RISK. PATIENT WILL BE SEEN 
WITHIN 1-2 DAYS OF ADMISSION.



2/17/232/18/23



RD RECEIVED REFERRAL REQUEST FOR POOR PO INTAKE > 3 DAYS AND UNINTENTIONAL WEIGHT LOSS ON 
2/17/23



REVIEWED BY ANGUS CASEY RD

## 2023-02-17 NOTE — NUR
DC PLANNIN YRS OLD FEMALE PATIENT WAS ADMITTED FROM HOME WITH A DX OF HYPOGLYCEMIA.PATIENT HAS A 
HISTORY OF   HTN, DIVERTICULITIS  AND COLOSTOMY. CT ABD/PELVIS SHOWED EXTENSIVE DISTENTION 
OF STOMACH ND SMALL BOWEL. RAPID COVID TEST NEGATIVE. ADMINISTERED IVF AND CONTINUED HOME 
MEDS. CONSULTED WITH GI, CARDIO AND SURGEON. DC PLAN PER PT RESPOND TO THE TREATMENT. CM TO 
FOLLOW 

-------------------------------------------------------------------------------

Addendum: 23 at 1404 by Ani Varner RN

-------------------------------------------------------------------------------

DC PLANNING: 

SEEN BY DR LOBO ORDERED TO REPEAT ENDOSCOPY AND COLONOSCOPY AS DICTATED BY THE CLINICAL 
COURSE. SURGICAL CONSULTATION FOR FURTHER INTERVENTION.  CONTINUED ROCEPHIN IV ABX FOR UTI . 
CM TO FOLLOW 

-------------------------------------------------------------------------------

Addendum: 23 at 1406 by Ani Varner RN

-------------------------------------------------------------------------------

DC PLANNING:

PATIENT HAS AN ORDER FOR HIGHER LEVEL OF CARE FOR PERICARDIOCENTESIS. FAXED TO [facility], [facility] [place] [place] AND [facility].  CM TO FOLLOW 

-------------------------------------------------------------------------------

Addendum: 23 at 1537 by Ani Varner RN

-------------------------------------------------------------------------------

DC PLANNING:

RECEIVED A CALL FROM [facility] [place] [place]  TRANSFER CENTER STATED THEY ARE THEIR CAPACITY. [facility] SPOKE WITH RHONDA STATED THEIR CARDIOTHORACIC SURGEON DENIED AND STATED CARDIOLOGIST 
CAN PERFORM THE PROCEDURE. KRYSTAL FROM [facility] UPDATED THE CLINICALS AND PROVIDE DR BECK'S NUMBER FOR PEER TO PEER CM TO FOLLOW

## 2023-02-17 NOTE — NUR
PT BLOOD GLUCOSE ID 70. MILK AND SANDWICH WAS GIVEN TO PT. ALL SAFETY MEASURES IMPLEMENTED. 
BED IN LOW POSITION, BED WHEELS ON LOCK AND CALL LIGHT WITHIN REACH.

## 2023-02-17 NOTE — NUR
PT BLOOD GLUCOSE IS NOW . ALL SAFETY MEASURES IMPLEMENTED. BED IN LOW POSITION, BED 
WHEELS ON LOCK AND CALL LIGHT WITHIN REACH.

## 2023-02-18 VITALS — DIASTOLIC BLOOD PRESSURE: 46 MMHG | SYSTOLIC BLOOD PRESSURE: 100 MMHG

## 2023-02-18 VITALS — SYSTOLIC BLOOD PRESSURE: 107 MMHG | DIASTOLIC BLOOD PRESSURE: 55 MMHG

## 2023-02-18 VITALS — DIASTOLIC BLOOD PRESSURE: 45 MMHG | SYSTOLIC BLOOD PRESSURE: 124 MMHG

## 2023-02-18 VITALS — DIASTOLIC BLOOD PRESSURE: 48 MMHG | SYSTOLIC BLOOD PRESSURE: 110 MMHG

## 2023-02-18 LAB
ANION GAP SERPL CALCULATED.3IONS-SCNC: 10.1 MMOL/L (ref 8–16)
BASOPHILS # BLD AUTO: 0 K/UL (ref 0–0.22)
BASOPHILS NFR BLD AUTO: 0.3 % (ref 0–2)
BUN SERPL-MCNC: 11 MG/DL (ref 7–18)
CHLORIDE SERPL-SCNC: 109 MMOL/L (ref 98–107)
CO2 SERPL-SCNC: 24.3 MMOL/L (ref 21–32)
CREAT SERPL-MCNC: 0.5 MG/DL (ref 0.6–1.3)
EOSINOPHIL # BLD AUTO: 0.1 K/UL (ref 0–0.4)
EOSINOPHIL NFR BLD AUTO: 0.4 % (ref 0–4)
ERYTHROCYTE [DISTWIDTH] IN BLOOD BY AUTOMATED COUNT: 16 % (ref 11.6–13.7)
GFR SERPL CREATININE-BSD FRML MDRD: (no result) ML/MIN (ref 90–?)
GLUCOSE SERPL-MCNC: 89 MG/DL (ref 74–106)
HCT VFR BLD AUTO: 31.9 % (ref 36–48)
HGB BLD-MCNC: 10.9 G/DL (ref 12–16)
LYMPHOCYTES # BLD AUTO: 4.6 K/UL (ref 2.5–16.5)
LYMPHOCYTES NFR BLD AUTO: 38.4 % (ref 20.5–51.1)
MAGNESIUM SERPL-MCNC: 1.4 MG/DL (ref 1.8–2.4)
MCH RBC QN AUTO: 32 PG (ref 27–31)
MCHC RBC AUTO-ENTMCNC: 34 G/DL (ref 33–37)
MCV RBC AUTO: 92 FL (ref 80–94)
MONOCYTES # BLD AUTO: 0.7 K/UL (ref 0.8–1)
MONOCYTES NFR BLD AUTO: 5.9 % (ref 1.7–9.3)
NEUTROPHILS # BLD AUTO: 6.6 K/UL (ref 1.8–7.7)
NEUTROPHILS NFR BLD AUTO: 55 % (ref 42.2–75.2)
PHOSPHATE SERPL-MCNC: 2.6 MG/DL (ref 2.5–4.9)
PLATELET # BLD AUTO: 329 K/UL (ref 140–450)
POTASSIUM SERPL-SCNC: 3.4 MMOL/L (ref 3.5–5.1)
RBC # BLD AUTO: 3.47 MIL/UL (ref 4.2–5.4)
SODIUM SERPL-SCNC: 140 MMOL/L (ref 136–145)
WBC # BLD AUTO: 12 K/UL (ref 4.8–10.8)

## 2023-02-18 RX ADMIN — TOLTERODINE TARTRATE SCH MG: 4 CAPSULE, EXTENDED RELEASE ORAL at 21:39

## 2023-02-18 RX ADMIN — Medication SCH DEV: at 16:38

## 2023-02-18 RX ADMIN — ESCITALOPRAM OXALATE SCH MG: 20 TABLET, FILM COATED ORAL at 08:46

## 2023-02-18 RX ADMIN — DOCUSATE SODIUM SCH MG: 100 CAPSULE, LIQUID FILLED ORAL at 21:38

## 2023-02-18 RX ADMIN — Medication SCH EACH: at 08:47

## 2023-02-18 RX ADMIN — OXYCODONE HYDROCHLORIDE AND ACETAMINOPHEN SCH MG: 500 TABLET ORAL at 08:46

## 2023-02-18 RX ADMIN — DEXTROSE AND SODIUM CHLORIDE SCH MLS/HR: 5; .9 INJECTION, SOLUTION INTRAVENOUS at 16:39

## 2023-02-18 RX ADMIN — DEXTROSE AND SODIUM CHLORIDE SCH MLS/HR: 5; .9 INJECTION, SOLUTION INTRAVENOUS at 03:22

## 2023-02-18 RX ADMIN — ATORVASTATIN CALCIUM SCH MG: 20 TABLET, FILM COATED ORAL at 21:39

## 2023-02-18 RX ADMIN — MULTIVITAMIN TABLET SCH TAB: TABLET at 08:45

## 2023-02-18 RX ADMIN — Medication SCH DEV: at 06:02

## 2023-02-18 RX ADMIN — DOCUSATE SODIUM SCH MG: 100 CAPSULE, LIQUID FILLED ORAL at 08:46

## 2023-02-18 RX ADMIN — Medication SCH MG: at 08:46

## 2023-02-18 RX ADMIN — POTASSIUM CHLORIDE PRN MEQ: 750 TABLET, FILM COATED, EXTENDED RELEASE ORAL at 09:22

## 2023-02-18 RX ADMIN — MAGNESIUM SULFATE IN WATER PRN MLS/HR: 40 INJECTION, SOLUTION INTRAVENOUS at 09:31

## 2023-02-18 RX ADMIN — PANTOPRAZOLE SODIUM SCH MG: 40 INJECTION, POWDER, FOR SOLUTION INTRAVENOUS at 08:47

## 2023-02-18 RX ADMIN — Medication SCH DEV: at 21:47

## 2023-02-18 RX ADMIN — Medication SCH DEV: at 12:20

## 2023-02-18 NOTE — NUR
rn notes



patient remains on room air, no sob noted, VSS all shift.  Last BS at 73, running d5 NS IVF. 
 Colostomy bag remains clean, and empty at this time.  Plan is to have CT scan of chest , 
abd, and pelvis.  Pending signature from MD.

## 2023-02-19 VITALS — DIASTOLIC BLOOD PRESSURE: 45 MMHG | SYSTOLIC BLOOD PRESSURE: 112 MMHG

## 2023-02-19 VITALS — SYSTOLIC BLOOD PRESSURE: 99 MMHG | DIASTOLIC BLOOD PRESSURE: 47 MMHG

## 2023-02-19 VITALS — SYSTOLIC BLOOD PRESSURE: 123 MMHG | DIASTOLIC BLOOD PRESSURE: 55 MMHG

## 2023-02-19 VITALS — SYSTOLIC BLOOD PRESSURE: 102 MMHG | DIASTOLIC BLOOD PRESSURE: 39 MMHG

## 2023-02-19 LAB
ANION GAP SERPL CALCULATED.3IONS-SCNC: 10.2 MMOL/L (ref 8–16)
BASOPHILS # BLD AUTO: 0 K/UL (ref 0–0.22)
BASOPHILS NFR BLD AUTO: 0.3 % (ref 0–2)
BUN SERPL-MCNC: 11 MG/DL (ref 7–18)
CHLORIDE SERPL-SCNC: 107 MMOL/L (ref 98–107)
CO2 SERPL-SCNC: 26 MMOL/L (ref 21–32)
CREAT SERPL-MCNC: 0.5 MG/DL (ref 0.6–1.3)
EOSINOPHIL # BLD AUTO: 0 K/UL (ref 0–0.4)
EOSINOPHIL NFR BLD AUTO: 0.2 % (ref 0–4)
ERYTHROCYTE [DISTWIDTH] IN BLOOD BY AUTOMATED COUNT: 16.2 % (ref 11.6–13.7)
GFR SERPL CREATININE-BSD FRML MDRD: (no result) ML/MIN (ref 90–?)
GLUCOSE SERPL-MCNC: 101 MG/DL (ref 74–106)
HCT VFR BLD AUTO: 34.9 % (ref 36–48)
HGB BLD-MCNC: 11.7 G/DL (ref 12–16)
LYMPHOCYTES # BLD AUTO: 4 K/UL (ref 2.5–16.5)
LYMPHOCYTES NFR BLD AUTO: 30.4 % (ref 20.5–51.1)
MAGNESIUM SERPL-MCNC: 1.9 MG/DL (ref 1.8–2.4)
MCH RBC QN AUTO: 31 PG (ref 27–31)
MCHC RBC AUTO-ENTMCNC: 34 G/DL (ref 33–37)
MCV RBC AUTO: 90.8 FL (ref 80–94)
MONOCYTES # BLD AUTO: 0.6 K/UL (ref 0.8–1)
MONOCYTES NFR BLD AUTO: 4.6 % (ref 1.7–9.3)
NEUTROPHILS # BLD AUTO: 8.4 K/UL (ref 1.8–7.7)
NEUTROPHILS NFR BLD AUTO: 64.5 % (ref 42.2–75.2)
PHOSPHATE SERPL-MCNC: 2.3 MG/DL (ref 2.5–4.9)
PLATELET # BLD AUTO: 351 K/UL (ref 140–450)
POTASSIUM SERPL-SCNC: 4.2 MMOL/L (ref 3.5–5.1)
RBC # BLD AUTO: 3.85 MIL/UL (ref 4.2–5.4)
SODIUM SERPL-SCNC: 139 MMOL/L (ref 136–145)
WBC # BLD AUTO: 13.1 K/UL (ref 4.8–10.8)

## 2023-02-19 RX ADMIN — Medication SCH MG: at 08:32

## 2023-02-19 RX ADMIN — TOLTERODINE TARTRATE SCH MG: 4 CAPSULE, EXTENDED RELEASE ORAL at 21:34

## 2023-02-19 RX ADMIN — DEXTROSE AND SODIUM CHLORIDE SCH MLS/HR: 5; .9 INJECTION, SOLUTION INTRAVENOUS at 12:45

## 2023-02-19 RX ADMIN — Medication SCH DEV: at 21:00

## 2023-02-19 RX ADMIN — DOCUSATE SODIUM SCH MG: 100 CAPSULE, LIQUID FILLED ORAL at 08:28

## 2023-02-19 RX ADMIN — ESCITALOPRAM OXALATE SCH MG: 20 TABLET, FILM COATED ORAL at 08:28

## 2023-02-19 RX ADMIN — DEXTROSE AND SODIUM CHLORIDE SCH MLS/HR: 5; .9 INJECTION, SOLUTION INTRAVENOUS at 04:20

## 2023-02-19 RX ADMIN — Medication SCH DEV: at 07:09

## 2023-02-19 RX ADMIN — MULTIVITAMIN TABLET SCH TAB: TABLET at 08:29

## 2023-02-19 RX ADMIN — PANTOPRAZOLE SODIUM SCH MG: 40 INJECTION, POWDER, FOR SOLUTION INTRAVENOUS at 08:32

## 2023-02-19 RX ADMIN — Medication SCH DEV: at 12:26

## 2023-02-19 RX ADMIN — Medication SCH DEV: at 17:29

## 2023-02-19 RX ADMIN — ATORVASTATIN CALCIUM SCH MG: 20 TABLET, FILM COATED ORAL at 21:34

## 2023-02-19 RX ADMIN — OXYCODONE HYDROCHLORIDE AND ACETAMINOPHEN SCH MG: 500 TABLET ORAL at 08:29

## 2023-02-19 RX ADMIN — DOCUSATE SODIUM SCH MG: 100 CAPSULE, LIQUID FILLED ORAL at 21:34

## 2023-02-19 RX ADMIN — Medication SCH EACH: at 08:28

## 2023-02-20 VITALS — SYSTOLIC BLOOD PRESSURE: 119 MMHG | DIASTOLIC BLOOD PRESSURE: 58 MMHG

## 2023-02-20 VITALS — DIASTOLIC BLOOD PRESSURE: 48 MMHG | SYSTOLIC BLOOD PRESSURE: 96 MMHG

## 2023-02-20 VITALS — SYSTOLIC BLOOD PRESSURE: 121 MMHG | DIASTOLIC BLOOD PRESSURE: 68 MMHG

## 2023-02-20 VITALS — DIASTOLIC BLOOD PRESSURE: 56 MMHG | SYSTOLIC BLOOD PRESSURE: 112 MMHG

## 2023-02-20 VITALS — DIASTOLIC BLOOD PRESSURE: 45 MMHG | SYSTOLIC BLOOD PRESSURE: 126 MMHG

## 2023-02-20 VITALS — DIASTOLIC BLOOD PRESSURE: 43 MMHG | SYSTOLIC BLOOD PRESSURE: 111 MMHG

## 2023-02-20 LAB
ANION GAP SERPL CALCULATED.3IONS-SCNC: 9.8 MMOL/L (ref 8–16)
BASOPHILS # BLD AUTO: 0 K/UL (ref 0–0.22)
BASOPHILS NFR BLD AUTO: 0.4 % (ref 0–2)
BUN SERPL-MCNC: 8 MG/DL (ref 7–18)
CHLORIDE SERPL-SCNC: 110 MMOL/L (ref 98–107)
CO2 SERPL-SCNC: 22.1 MMOL/L (ref 21–32)
CREAT SERPL-MCNC: 0.5 MG/DL (ref 0.6–1.3)
EOSINOPHIL # BLD AUTO: 0.1 K/UL (ref 0–0.4)
EOSINOPHIL NFR BLD AUTO: 1.4 % (ref 0–4)
ERYTHROCYTE [DISTWIDTH] IN BLOOD BY AUTOMATED COUNT: 16 % (ref 11.6–13.7)
GFR SERPL CREATININE-BSD FRML MDRD: (no result) ML/MIN (ref 90–?)
GLUCOSE SERPL-MCNC: 83 MG/DL (ref 74–106)
HCT VFR BLD AUTO: 31.1 % (ref 36–48)
HGB BLD-MCNC: 10.5 G/DL (ref 12–16)
LYMPHOCYTES # BLD AUTO: 3.6 K/UL (ref 2.5–16.5)
LYMPHOCYTES NFR BLD AUTO: 42.4 % (ref 20.5–51.1)
MAGNESIUM SERPL-MCNC: 1.7 MG/DL (ref 1.8–2.4)
MCH RBC QN AUTO: 31 PG (ref 27–31)
MCHC RBC AUTO-ENTMCNC: 34 G/DL (ref 33–37)
MCV RBC AUTO: 92.1 FL (ref 80–94)
MONOCYTES # BLD AUTO: 0.5 K/UL (ref 0.8–1)
MONOCYTES NFR BLD AUTO: 5.3 % (ref 1.7–9.3)
NEUTROPHILS # BLD AUTO: 4.4 K/UL (ref 1.8–7.7)
NEUTROPHILS NFR BLD AUTO: 50.5 % (ref 42.2–75.2)
PHOSPHATE SERPL-MCNC: 3.2 MG/DL (ref 2.5–4.9)
PLATELET # BLD AUTO: 294 K/UL (ref 140–450)
POTASSIUM SERPL-SCNC: 3.9 MMOL/L (ref 3.5–5.1)
RBC # BLD AUTO: 3.38 MIL/UL (ref 4.2–5.4)
SODIUM SERPL-SCNC: 138 MMOL/L (ref 136–145)
WBC # BLD AUTO: 8.6 K/UL (ref 4.8–10.8)

## 2023-02-20 RX ADMIN — Medication SCH DEV: at 11:34

## 2023-02-20 RX ADMIN — Medication SCH EACH: at 09:45

## 2023-02-20 RX ADMIN — MAGNESIUM SULFATE IN WATER PRN MLS/HR: 40 INJECTION, SOLUTION INTRAVENOUS at 14:37

## 2023-02-20 RX ADMIN — DOCUSATE SODIUM SCH MG: 100 CAPSULE, LIQUID FILLED ORAL at 09:37

## 2023-02-20 RX ADMIN — Medication SCH DEV: at 17:28

## 2023-02-20 RX ADMIN — ATORVASTATIN CALCIUM SCH MG: 20 TABLET, FILM COATED ORAL at 21:11

## 2023-02-20 RX ADMIN — Medication SCH MG: at 09:37

## 2023-02-20 RX ADMIN — DEXTROSE AND SODIUM CHLORIDE SCH MLS/HR: 5; .9 INJECTION, SOLUTION INTRAVENOUS at 02:09

## 2023-02-20 RX ADMIN — TOLTERODINE TARTRATE SCH MG: 4 CAPSULE, EXTENDED RELEASE ORAL at 21:11

## 2023-02-20 RX ADMIN — Medication SCH DEV: at 21:00

## 2023-02-20 RX ADMIN — Medication SCH DEV: at 07:30

## 2023-02-20 RX ADMIN — DEXTROSE AND SODIUM CHLORIDE SCH MLS/HR: 5; .9 INJECTION, SOLUTION INTRAVENOUS at 18:45

## 2023-02-20 RX ADMIN — MULTIVITAMIN TABLET SCH TAB: TABLET at 09:36

## 2023-02-20 RX ADMIN — DOCUSATE SODIUM SCH MG: 100 CAPSULE, LIQUID FILLED ORAL at 21:11

## 2023-02-20 RX ADMIN — PANTOPRAZOLE SODIUM SCH MG: 40 INJECTION, POWDER, FOR SOLUTION INTRAVENOUS at 09:36

## 2023-02-20 RX ADMIN — DEXTROSE MONOHYDRATE PRN ML: 25 INJECTION, SOLUTION INTRAVENOUS at 11:33

## 2023-02-20 RX ADMIN — OXYCODONE HYDROCHLORIDE AND ACETAMINOPHEN SCH MG: 500 TABLET ORAL at 09:37

## 2023-02-20 RX ADMIN — DEXTROSE AND SODIUM CHLORIDE SCH MLS/HR: 5; .9 INJECTION, SOLUTION INTRAVENOUS at 08:45

## 2023-02-20 NOTE — NUR
RECEIVED PT. FROM ANALISA CEBALLOS FOR CONTINUITY OF CARE. PER REPORT PT. AOX4. ON ROOM AIR. SINUS 
RHYTHM ON THE MONITOR. DIET CCHO. PT. IV TO RIGHT UPPER ARM PICC LINE PATENT AND INTACT, 
INFUSING D5NS  ML/HR. SKIN INTACT. PT. SLEEPING AT THIS TIME. NO S/S OF PAIN NOTED. 
PROVIDED SAFE AND QUIET ENVIRONMENT.

-------------------------------------------------------------------------------

Addendum: 02/21/23 at 0109 by Mackenzie Simmons RN

-------------------------------------------------------------------------------

CORRECTION: ON CARDIAC DIET

## 2023-02-20 NOTE — NUR
GOT REPORT FROM NIGHT NURSE, PT AWAKE WANT TO HAVE A PANT, NO SOB IV INFUSING AS 
ORDERED.[id number]

-------------------------------------------------------------------------------

Addendum: 02/20/23 at 0738 by Edd Avitia RN

-------------------------------------------------------------------------------

THE ABOVE NOT IS NOT FOR THESE PATIENT.[id number]

## 2023-02-20 NOTE — NUR
2/20/23 RD FOLLOW UP COMPLETED



PLEASE REFER TO NUTRITION ASSESSMENT UNDER CARE ACTIVITY FOR ESTIMATED NUTRITIONAL NEEDS. 



1. CONTINUE CARDIAC DIET, AS TOLERATED 

2. CONTINUE ENSURE BID.

- ENSURE BID PROVIDES 700 KCALS, 40 G PROTEIN

3. MONITOR PO INTAKE, GI, LAB VALUES, WEIGHTS. 

4. RD TO FOLLOW-UP 7 DAYS, LOW RISK



REVIEWED BY ANGUS CASEY RD

## 2023-02-20 NOTE — NUR
RECEIVED PT. FROM ANALISA CEBALLOS FOR CONTINUITY OF CARE. PER REPORT PT. AOX4. ON ROOM AIR. SINUS 
RHYTHM ON THE MONITOR. DIET CCHO. PT. IV TO RIGHT UPPER ARM PICC LINE PATENT AND INTACT, 
INFUSING D5NS  ML/HR. SKIN INTACT. PT. SLEEPING AT THIS TIME. NO S/S OF PAIN NOTED. 
PROVIDED SAFE AND QUIET ENVIRONMENT.

-------------------------------------------------------------------------------

Addendum: 02/21/23 at 0034 by Mackenzie Simmons RN

-------------------------------------------------------------------------------

WRONG TIME. CHARTED THE CORRECT TIME AT 2330

## 2023-02-21 VITALS — SYSTOLIC BLOOD PRESSURE: 118 MMHG | DIASTOLIC BLOOD PRESSURE: 63 MMHG

## 2023-02-21 VITALS — SYSTOLIC BLOOD PRESSURE: 110 MMHG | DIASTOLIC BLOOD PRESSURE: 65 MMHG

## 2023-02-21 VITALS — DIASTOLIC BLOOD PRESSURE: 64 MMHG | SYSTOLIC BLOOD PRESSURE: 99 MMHG

## 2023-02-21 VITALS — DIASTOLIC BLOOD PRESSURE: 49 MMHG | SYSTOLIC BLOOD PRESSURE: 110 MMHG

## 2023-02-21 VITALS — DIASTOLIC BLOOD PRESSURE: 62 MMHG | SYSTOLIC BLOOD PRESSURE: 113 MMHG

## 2023-02-21 VITALS — SYSTOLIC BLOOD PRESSURE: 111 MMHG | DIASTOLIC BLOOD PRESSURE: 57 MMHG

## 2023-02-21 VITALS — SYSTOLIC BLOOD PRESSURE: 119 MMHG | DIASTOLIC BLOOD PRESSURE: 64 MMHG

## 2023-02-21 LAB
ANION GAP SERPL CALCULATED.3IONS-SCNC: 10.7 MMOL/L (ref 8–16)
APPEARANCE FLD: CLEAR
APPEARANCE SPUN FLD: CLEAR
BASOPHILS # BLD AUTO: 0 K/UL (ref 0–0.22)
BASOPHILS NFR BLD AUTO: 0.4 % (ref 0–2)
BODY FLD TYPE: (no result)
BUN SERPL-MCNC: 6 MG/DL (ref 7–18)
CHLORIDE SERPL-SCNC: 110 MMOL/L (ref 98–107)
CO2 SERPL-SCNC: 24.8 MMOL/L (ref 21–32)
COLOR FLD: (no result)
CREAT SERPL-MCNC: 0.5 MG/DL (ref 0.6–1.3)
EOSINOPHIL # BLD AUTO: 0.1 K/UL (ref 0–0.4)
EOSINOPHIL NFR BLD AUTO: 1.4 % (ref 0–4)
ERYTHROCYTE [DISTWIDTH] IN BLOOD BY AUTOMATED COUNT: 16 % (ref 11.6–13.7)
GFR SERPL CREATININE-BSD FRML MDRD: (no result) ML/MIN (ref 90–?)
GLUCOSE SERPL-MCNC: 88 MG/DL (ref 74–106)
HCT VFR BLD AUTO: 31.3 % (ref 36–48)
HGB BLD-MCNC: 10.6 G/DL (ref 12–16)
LYMPHOCYTES # BLD AUTO: 3.5 K/UL (ref 2.5–16.5)
LYMPHOCYTES NFR BLD AUTO: 39.5 % (ref 20.5–51.1)
MAGNESIUM SERPL-MCNC: 1.8 MG/DL (ref 1.8–2.4)
MCH RBC QN AUTO: 31 PG (ref 27–31)
MCHC RBC AUTO-ENTMCNC: 34 G/DL (ref 33–37)
MCV RBC AUTO: 91.6 FL (ref 80–94)
MONOCYTES # BLD AUTO: 0.5 K/UL (ref 0.8–1)
MONOCYTES NFR BLD AUTO: 5.5 % (ref 1.7–9.3)
NEUTROPHILS # BLD AUTO: 4.8 K/UL (ref 1.8–7.7)
NEUTROPHILS NFR BLD AUTO: 53.2 % (ref 42.2–75.2)
PHOSPHATE SERPL-MCNC: 2.9 MG/DL (ref 2.5–4.9)
PLATELET # BLD AUTO: 316 K/UL (ref 140–450)
POTASSIUM SERPL-SCNC: 3.5 MMOL/L (ref 3.5–5.1)
PROTHROMBIN TIME: 10.8 SECS (ref 10.8–13.4)
RBC # BLD AUTO: 3.42 MIL/UL (ref 4.2–5.4)
RBC # FLD MANUAL: 22 /CU. MM.
SODIUM SERPL-SCNC: 142 MMOL/L (ref 136–145)
SPECIMEN VOL FLD: 450 ML
WBC # BLD AUTO: 8.9 K/UL (ref 4.8–10.8)
WBC # FLD MANUAL: 5 /CU. MM.

## 2023-02-21 PROCEDURE — 02H633Z INSERTION OF INFUSION DEVICE INTO RIGHT ATRIUM, PERCUTANEOUS APPROACH: ICD-10-PCS

## 2023-02-21 PROCEDURE — 0W993ZZ DRAINAGE OF RIGHT PLEURAL CAVITY, PERCUTANEOUS APPROACH: ICD-10-PCS

## 2023-02-21 PROCEDURE — 02HV33Z INSERTION OF INFUSION DEVICE INTO SUPERIOR VENA CAVA, PERCUTANEOUS APPROACH: ICD-10-PCS

## 2023-02-21 PROCEDURE — B548ZZA ULTRASONOGRAPHY OF SUPERIOR VENA CAVA, GUIDANCE: ICD-10-PCS

## 2023-02-21 RX ADMIN — Medication SCH DEV: at 20:55

## 2023-02-21 RX ADMIN — DEXTROSE, SODIUM CHLORIDE, AND POTASSIUM CHLORIDE SCH MLS/HR: 5; .45; .3 INJECTION INTRAVENOUS at 18:41

## 2023-02-21 RX ADMIN — ATORVASTATIN CALCIUM SCH MG: 20 TABLET, FILM COATED ORAL at 21:08

## 2023-02-21 RX ADMIN — DOCUSATE SODIUM SCH MG: 100 CAPSULE, LIQUID FILLED ORAL at 09:08

## 2023-02-21 RX ADMIN — DEXTROSE, SODIUM CHLORIDE, AND POTASSIUM CHLORIDE SCH MLS/HR: 5; .45; .3 INJECTION INTRAVENOUS at 09:45

## 2023-02-21 RX ADMIN — Medication SCH DEV: at 16:45

## 2023-02-21 RX ADMIN — OXYCODONE HYDROCHLORIDE AND ACETAMINOPHEN SCH MG: 500 TABLET ORAL at 09:08

## 2023-02-21 RX ADMIN — PANTOPRAZOLE SODIUM SCH MG: 40 INJECTION, POWDER, FOR SOLUTION INTRAVENOUS at 21:07

## 2023-02-21 RX ADMIN — METOCLOPRAMIDE SCH MG: 5 INJECTION, SOLUTION INTRAMUSCULAR; INTRAVENOUS at 23:58

## 2023-02-21 RX ADMIN — MULTIVITAMIN TABLET SCH TAB: TABLET at 09:08

## 2023-02-21 RX ADMIN — PANTOPRAZOLE SODIUM SCH MG: 40 INJECTION, POWDER, FOR SOLUTION INTRAVENOUS at 09:15

## 2023-02-21 RX ADMIN — METOCLOPRAMIDE SCH MG: 5 INJECTION, SOLUTION INTRAMUSCULAR; INTRAVENOUS at 15:36

## 2023-02-21 RX ADMIN — Medication SCH DEV: at 08:23

## 2023-02-21 RX ADMIN — Medication SCH DEV: at 11:53

## 2023-02-21 RX ADMIN — SENNOSIDES A AND B SCH MG: 8.6 TABLET, FILM COATED ORAL at 12:21

## 2023-02-21 RX ADMIN — SENNOSIDES A AND B SCH MG: 8.6 TABLET, FILM COATED ORAL at 16:46

## 2023-02-21 RX ADMIN — Medication SCH DEV: at 06:45

## 2023-02-21 RX ADMIN — DEXTROSE AND SODIUM CHLORIDE SCH MLS/HR: 5; .9 INJECTION, SOLUTION INTRAVENOUS at 04:45

## 2023-02-21 RX ADMIN — Medication SCH EACH: at 09:08

## 2023-02-21 RX ADMIN — Medication SCH MG: at 09:00

## 2023-02-21 NOTE — NUR
ASSUMED CONTINUITY OF CARE. INITIAL ASSESSMENT DONE. KEEP COMFORTABLE ON BED. FALL 
PRECAUTION APPLIED. CALL LIGHT WITHIN REACH.

## 2023-02-21 NOTE — NUR
RECEIVED REPORT FROM DAY SHIFT NURSE DEBBIE FOR CONTINUITY OF CARE. PATIENT IS A&O X4, Angolan 
SPEAKING. PATIENT IS ON ROOM AIR, BREATHING IS NORMAL WITH SYMMETRICAL RISE AND FALL OF 
CHEST. IV IS A DOUBLE LUMEN PICC LINE IN THE RIGHT UPPER ARM; RUNNING D5 1/2 NS KCL 40 MEQ 
AT 40 ML/HR. PATIENT IS AWAKE, SITTING UP IN BED, VISITING WITH FAMILY. BED IS IN LOWEST 
POSITION, WHEELS LOCKED CALL LIGHT IN PLACE. WILL CONTINUE TO OBSERVE PATIENT.

## 2023-02-21 NOTE — NUR
***** PHYSICAL THERAPY CO-SIGN *****

The Physical Therapy Progress Notes documented by Physical Therapy Assistant have been 
reviewed.



Reviewed/Co-Signed by: Maia Cannon

Documentation Done by: NASREEN MUNOZ PTA

-------------------------------------------------------------------------------

Addendum: 02/21/23 at 1537 by Maia Cannon PT

-------------------------------------------------------------------------------

Amended: Links added.

## 2023-02-21 NOTE — NUR
DAY SHIFT NURSE DEBBIE AND ER NURSE ONI ADMINISTERED FLEET ENEMA TO PATIENT INSIDE OF 
PATIENT'S STOMA. PATIENT TOLERATED WELL. AFTERWARDS A NEW COLOSTOMY BAG WAS PLACED ON 
PATIENT'S STOMA (LOCATED ON THE UPPER LEFT QUADRANT).  WILL CONTINUE TO OBSERVE PATIENT.

## 2023-02-22 VITALS — SYSTOLIC BLOOD PRESSURE: 97 MMHG | DIASTOLIC BLOOD PRESSURE: 47 MMHG

## 2023-02-22 VITALS — SYSTOLIC BLOOD PRESSURE: 98 MMHG | DIASTOLIC BLOOD PRESSURE: 52 MMHG

## 2023-02-22 VITALS — DIASTOLIC BLOOD PRESSURE: 46 MMHG | SYSTOLIC BLOOD PRESSURE: 99 MMHG

## 2023-02-22 VITALS — DIASTOLIC BLOOD PRESSURE: 65 MMHG | SYSTOLIC BLOOD PRESSURE: 118 MMHG

## 2023-02-22 VITALS — DIASTOLIC BLOOD PRESSURE: 56 MMHG | SYSTOLIC BLOOD PRESSURE: 105 MMHG

## 2023-02-22 LAB
AFP (TUMOR MARKER): <1.8 NG/ML (ref 0–8.7)
ANION GAP SERPL CALCULATED.3IONS-SCNC: 11.5 MMOL/L (ref 8–16)
BASOPHILS # BLD AUTO: 0.1 K/UL (ref 0–0.22)
BASOPHILS NFR BLD AUTO: 0.6 % (ref 0–2)
BUN SERPL-MCNC: 6 MG/DL (ref 7–18)
CHLORIDE SERPL-SCNC: 106 MMOL/L (ref 98–107)
CO2 SERPL-SCNC: 26.2 MMOL/L (ref 21–32)
CREAT SERPL-MCNC: 0.5 MG/DL (ref 0.6–1.3)
EOSINOPHIL # BLD AUTO: 0.1 K/UL (ref 0–0.4)
EOSINOPHIL NFR BLD AUTO: 0.5 % (ref 0–4)
ERYTHROCYTE [DISTWIDTH] IN BLOOD BY AUTOMATED COUNT: 16.1 % (ref 11.6–13.7)
GFR SERPL CREATININE-BSD FRML MDRD: (no result) ML/MIN (ref 90–?)
GLUCOSE SERPL-MCNC: 95 MG/DL (ref 74–106)
HCT VFR BLD AUTO: 36.1 % (ref 36–48)
HGB BLD-MCNC: 12 G/DL (ref 12–16)
LYMPHOCYTES # BLD AUTO: 3.8 K/UL (ref 2.5–16.5)
LYMPHOCYTES NFR BLD AUTO: 31.1 % (ref 20.5–51.1)
MAGNESIUM SERPL-MCNC: 1.7 MG/DL (ref 1.8–2.4)
MCH RBC QN AUTO: 30 PG (ref 27–31)
MCHC RBC AUTO-ENTMCNC: 33 G/DL (ref 33–37)
MCV RBC AUTO: 91.2 FL (ref 80–94)
MONOCYTES # BLD AUTO: 0.6 K/UL (ref 0.8–1)
MONOCYTES NFR BLD AUTO: 5 % (ref 1.7–9.3)
NEUTROPHILS # BLD AUTO: 7.7 K/UL (ref 1.8–7.7)
NEUTROPHILS NFR BLD AUTO: 62.8 % (ref 42.2–75.2)
PHOSPHATE SERPL-MCNC: 2.8 MG/DL (ref 2.5–4.9)
PLATELET # BLD AUTO: 339 K/UL (ref 140–450)
POTASSIUM SERPL-SCNC: 2.7 MMOL/L (ref 3.5–5.1)
RBC # BLD AUTO: 3.96 MIL/UL (ref 4.2–5.4)
SODIUM SERPL-SCNC: 141 MMOL/L (ref 136–145)
WBC # BLD AUTO: 12.2 K/UL (ref 4.8–10.8)

## 2023-02-22 RX ADMIN — Medication SCH DEV: at 20:10

## 2023-02-22 RX ADMIN — Medication SCH DEV: at 08:44

## 2023-02-22 RX ADMIN — DEXTROSE SCH MLS/HR: 5 SOLUTION INTRAVENOUS at 09:45

## 2023-02-22 RX ADMIN — Medication SCH DEV: at 00:12

## 2023-02-22 RX ADMIN — MAGNESIUM SULFATE IN WATER PRN MLS/HR: 40 INJECTION, SOLUTION INTRAVENOUS at 13:11

## 2023-02-22 RX ADMIN — Medication SCH DEV: at 16:00

## 2023-02-22 RX ADMIN — SODIUM CHLORIDE SCH MLS/HR: 9 INJECTION, SOLUTION INTRAVENOUS at 17:12

## 2023-02-22 RX ADMIN — SENNOSIDES A AND B SCH MG: 8.6 TABLET, FILM COATED ORAL at 17:08

## 2023-02-22 RX ADMIN — DEXTROSE MONOHYDRATE PRN ML: 25 INJECTION, SOLUTION INTRAVENOUS at 02:11

## 2023-02-22 RX ADMIN — Medication SCH DEV: at 04:55

## 2023-02-22 RX ADMIN — DEXTROSE SCH MLS/HR: 5 SOLUTION INTRAVENOUS at 13:23

## 2023-02-22 RX ADMIN — SENNOSIDES A AND B SCH MG: 8.6 TABLET, FILM COATED ORAL at 13:36

## 2023-02-22 RX ADMIN — SENNOSIDES A AND B SCH MG: 8.6 TABLET, FILM COATED ORAL at 17:00

## 2023-02-22 RX ADMIN — OXYCODONE HYDROCHLORIDE AND ACETAMINOPHEN SCH MG: 500 TABLET ORAL at 09:00

## 2023-02-22 RX ADMIN — Medication SCH MG: at 09:00

## 2023-02-22 RX ADMIN — PANTOPRAZOLE SODIUM SCH MG: 40 INJECTION, POWDER, FOR SOLUTION INTRAVENOUS at 21:54

## 2023-02-22 RX ADMIN — ATORVASTATIN CALCIUM SCH MG: 20 TABLET, FILM COATED ORAL at 21:54

## 2023-02-22 RX ADMIN — Medication SCH DEV: at 11:41

## 2023-02-22 RX ADMIN — SENNOSIDES A AND B SCH MG: 8.6 TABLET, FILM COATED ORAL at 09:00

## 2023-02-22 RX ADMIN — METOCLOPRAMIDE SCH MG: 5 INJECTION, SOLUTION INTRAMUSCULAR; INTRAVENOUS at 06:36

## 2023-02-22 RX ADMIN — PANTOPRAZOLE SODIUM SCH MG: 40 INJECTION, POWDER, FOR SOLUTION INTRAVENOUS at 09:53

## 2023-02-22 NOTE — NUR
COLOSTOMY BAG EMPTIED WITH MODERATE AMOUNT LOOSE STOOL. CHANGED AND COLOSTOMY CARE WAS DONE. 
TOLERATED WELL.

## 2023-02-22 NOTE — NUR
DR. LOBO SPOKE TO PT. AND PT. DAUGHTER -IDALMIS AT BEDSIDE PT. DAUGHTER -IDALMIS TOLD DR. LOBO THAT SHE DON'T WANT NGT INSERTION AND COLONOSCOPY PROCEDURE UNTIL PT. SEEN OR CLEARED 
BY SURGEON. IJFORMED CHARGE NURSE HAYLEY -ANALISA.

## 2023-02-22 NOTE — NUR
BLOOD SUGAR 62. A & O X3. NO ACUTE DISTRESS NOTED. PT. DAUGHTER -IDALMIS ON BEDSIDE. PAGED 
DR. BECK AND INFORMED OF PT. BS 62. INFORMED CHARGE NURSE HAYLEY -ANALISA.

## 2023-02-22 NOTE — NUR
OBTAINED PATIENT'S 0000 BS. BS WAS 78. PATIENT WAS 93 AT 2000, AND GIVEN ORANGE JUICE SINCE 
PATIENT WAS SCHEDULED TO BE NPO AFTER MIDNIGHT. MESSAGED ON CALL PHYSICIAN DR. BECK ABOUT 
PATIENT'S DECREASE IN BS DESPITE HAVING BEEN GIVEN THE OJ, AND CURRENT NPO DIET. DR. BECK 
MESSAGED BACK TO ADMINISTER A DOSE OF D50 50ML ONE TIME AND CONTINUE WITH CURRENT IV FLUIDS. 
RECHECKED PATIENT'S BS BEFORE ADMINISTERING, BS WAS STILL 78; ADMINISTERED D50 AT 0211. 
PATIENT TOLERATED WELL. WILL CONTINUE TO OBSERVE PATIENT.

## 2023-02-22 NOTE — NUR
COLOSTOMY BAG WAS CHANGED BY KATARZYNA HAJI AND MYSELF AT 0430. PATIENT TOLERATED WELL. PATIENT 
COMPLAINED OF VOMITING AFTER 0600; ADMINISTERED SCHEDULED 0700 REGLAN AND GAVE PATIENT NEW 
VOMIT BAG. WILL ENDORSE TO DAY SHIFT NURSE.

## 2023-02-23 VITALS — SYSTOLIC BLOOD PRESSURE: 99 MMHG | DIASTOLIC BLOOD PRESSURE: 57 MMHG

## 2023-02-23 VITALS — DIASTOLIC BLOOD PRESSURE: 40 MMHG | SYSTOLIC BLOOD PRESSURE: 116 MMHG

## 2023-02-23 VITALS — DIASTOLIC BLOOD PRESSURE: 44 MMHG | SYSTOLIC BLOOD PRESSURE: 95 MMHG

## 2023-02-23 LAB
ANION GAP SERPL CALCULATED.3IONS-SCNC: 8.5 MMOL/L (ref 8–16)
BUN SERPL-MCNC: 7 MG/DL (ref 7–18)
CHLORIDE SERPL-SCNC: 109 MMOL/L (ref 98–107)
CO2 SERPL-SCNC: 29.4 MMOL/L (ref 21–32)
CREAT SERPL-MCNC: 0.4 MG/DL (ref 0.6–1.3)
GFR SERPL CREATININE-BSD FRML MDRD: (no result) ML/MIN (ref 90–?)
GLUCOSE SERPL-MCNC: 89 MG/DL (ref 74–106)
POTASSIUM SERPL-SCNC: 4.9 MMOL/L (ref 3.5–5.1)
SODIUM SERPL-SCNC: 142 MMOL/L (ref 136–145)

## 2023-02-23 PROCEDURE — 0DJ08ZZ INSPECTION OF UPPER INTESTINAL TRACT, VIA NATURAL OR ARTIFICIAL OPENING ENDOSCOPIC: ICD-10-PCS

## 2023-02-23 PROCEDURE — 0DBH8ZZ EXCISION OF CECUM, VIA NATURAL OR ARTIFICIAL OPENING ENDOSCOPIC: ICD-10-PCS

## 2023-02-23 RX ADMIN — Medication SCH DEV: at 23:50

## 2023-02-23 RX ADMIN — SODIUM CHLORIDE SCH MLS/HR: 9 INJECTION, SOLUTION INTRAVENOUS at 01:57

## 2023-02-23 RX ADMIN — DEXTROSE, SODIUM CHLORIDE, AND POTASSIUM CHLORIDE SCH MLS/HR: 5; .45; .3 INJECTION INTRAVENOUS at 10:37

## 2023-02-23 RX ADMIN — SODIUM CHLORIDE SCH MLS/HR: 9 INJECTION, SOLUTION INTRAVENOUS at 13:36

## 2023-02-23 RX ADMIN — Medication SCH DEV: at 16:45

## 2023-02-23 RX ADMIN — PANTOPRAZOLE SODIUM SCH MG: 40 INJECTION, POWDER, FOR SOLUTION INTRAVENOUS at 10:35

## 2023-02-23 RX ADMIN — ATORVASTATIN CALCIUM SCH MG: 20 TABLET, FILM COATED ORAL at 20:51

## 2023-02-23 RX ADMIN — OXYCODONE HYDROCHLORIDE AND ACETAMINOPHEN SCH MG: 500 TABLET ORAL at 09:00

## 2023-02-23 RX ADMIN — SODIUM CHLORIDE SCH MLS/HR: 9 INJECTION, SOLUTION INTRAVENOUS at 05:24

## 2023-02-23 RX ADMIN — Medication SCH DEV: at 20:00

## 2023-02-23 RX ADMIN — DEXTROSE, SODIUM CHLORIDE, AND POTASSIUM CHLORIDE SCH MLS/HR: 5; .45; .3 INJECTION INTRAVENOUS at 09:45

## 2023-02-23 RX ADMIN — Medication SCH DEV: at 08:19

## 2023-02-23 RX ADMIN — SODIUM CHLORIDE SCH MLS/HR: 9 INJECTION, SOLUTION INTRAVENOUS at 18:00

## 2023-02-23 RX ADMIN — DEXTROSE MONOHYDRATE PRN ML: 25 INJECTION, SOLUTION INTRAVENOUS at 16:56

## 2023-02-23 RX ADMIN — PANTOPRAZOLE SODIUM SCH MG: 40 INJECTION, POWDER, FOR SOLUTION INTRAVENOUS at 09:00

## 2023-02-23 RX ADMIN — Medication SCH DEV: at 04:00

## 2023-02-23 RX ADMIN — SENNOSIDES A AND B SCH MG: 8.6 TABLET, FILM COATED ORAL at 13:00

## 2023-02-23 RX ADMIN — Medication SCH DEV: at 12:13

## 2023-02-23 RX ADMIN — SENNOSIDES A AND B SCH MG: 8.6 TABLET, FILM COATED ORAL at 09:00

## 2023-02-23 RX ADMIN — Medication SCH DEV: at 00:00

## 2023-02-23 NOTE — NUR
SCHEDULED MEDICATIONS NOT GIVEN. PATIENT REFUSED. EXPLAINED RISK AND BENEFITS BUT STILL 
REFUSED. DAUGHTER AT BEDSIDE.

## 2023-02-23 NOTE — NUR
SEEN BY DR. BCEK. RECEIVED NEW ORDER NPO EXCEPT MEDS AND CARDIOLOGY CONSULT. NOTED AND 
CARRIED OUT. DAUGHTER AT BEDSIDE.

## 2023-02-23 NOTE — NUR
BEDSIDE REPORT GIVEN [facility] NURSE LYNN FOR CONTINUITY OF CARE. IVF INFUSING WELL. 
REMAINS STABLE.

## 2023-02-23 NOTE — NUR
COLOSTOMY BAG CHANGE. WITH MODERATE AMOUNT OF LOOSE STOOL. COLOSTOMY CARE RENDERED. 
TOLERATED WELL.

## 2023-02-23 NOTE — NUR
DISCUSSED NEOSTIGMINE ADMINISTRATION WITH CHARGE NURSE RENE. WILL CARDIAC MONITOR PATIENT 
FOR 30 MINUTES AFTER ADMINISTRATION.

## 2023-02-23 NOTE — NUR
DAUGHTER AT BEDSIDE, ASKING IF PT ON SCHEDULE FOR COLONOSCOPY TODAY. DR. LOBO NOTIFIED 
AWAITING FOR RESPONSE.

## 2023-02-23 NOTE — NUR
NURSE REPORT



REPORT OBTAINED FROM DAY SHIFT NURSE PHYLLIS AND THIS NURSE ASSUME CARE. VSS. AFEB. NO C/O 
PAIN OR DISCOMFORT.

## 2023-02-23 NOTE — NUR
PT RETURN FROM SURGERY. S/P COLONOSCOPY WITH POLYPECTOMY. DX OF GERD, COLON, POLYPS DILATED 
SMALL BOWEL. ALERT ORIENTED X4. RESP. EVEN AND UNLABORED. REMAINS STABLE. NO C/O PAIN OR 
DISCOMFORT.

## 2023-02-24 VITALS — SYSTOLIC BLOOD PRESSURE: 104 MMHG | DIASTOLIC BLOOD PRESSURE: 47 MMHG

## 2023-02-24 VITALS — DIASTOLIC BLOOD PRESSURE: 50 MMHG | SYSTOLIC BLOOD PRESSURE: 101 MMHG

## 2023-02-24 VITALS — SYSTOLIC BLOOD PRESSURE: 100 MMHG | DIASTOLIC BLOOD PRESSURE: 50 MMHG

## 2023-02-24 LAB
ALBUMIN,BODY FLUID: 1.3 G/DL
ANION GAP SERPL CALCULATED.3IONS-SCNC: 8.9 MMOL/L (ref 8–16)
BUN SERPL-MCNC: 6 MG/DL (ref 7–18)
CHLORIDE SERPL-SCNC: 108 MMOL/L (ref 98–107)
CO2 SERPL-SCNC: 26.6 MMOL/L (ref 21–32)
CREAT SERPL-MCNC: 0.6 MG/DL (ref 0.6–1.3)
GFR SERPL CREATININE-BSD FRML MDRD: (no result) ML/MIN (ref 90–?)
GLUCOSE SERPL-MCNC: 92 MG/DL (ref 74–106)
LDH FLD-CCNC: 79 U/L
MAGNESIUM SERPL-MCNC: 1.7 MG/DL (ref 1.8–2.4)
PHOSPHATE SERPL-MCNC: 3.3 MG/DL (ref 2.5–4.9)
POTASSIUM SERPL-SCNC: 4.5 MMOL/L (ref 3.5–5.1)
SODIUM SERPL-SCNC: 139 MMOL/L (ref 136–145)

## 2023-02-24 RX ADMIN — Medication SCH DEV: at 04:23

## 2023-02-24 RX ADMIN — Medication SCH DEV: at 23:40

## 2023-02-24 RX ADMIN — SODIUM CHLORIDE SCH MLS/HR: 9 INJECTION, SOLUTION INTRAVENOUS at 06:46

## 2023-02-24 RX ADMIN — SODIUM CHLORIDE SCH MLS/HR: 9 INJECTION, SOLUTION INTRAVENOUS at 00:37

## 2023-02-24 RX ADMIN — Medication SCH DEV: at 08:12

## 2023-02-24 RX ADMIN — OXYCODONE HYDROCHLORIDE AND ACETAMINOPHEN SCH MG: 500 TABLET ORAL at 09:56

## 2023-02-24 RX ADMIN — MAGNESIUM SULFATE IN WATER PRN MLS/HR: 40 INJECTION, SOLUTION INTRAVENOUS at 13:14

## 2023-02-24 RX ADMIN — FUROSEMIDE SCH MG: 20 TABLET ORAL at 09:53

## 2023-02-24 RX ADMIN — Medication SCH DEV: at 19:00

## 2023-02-24 RX ADMIN — Medication SCH DEV: at 16:31

## 2023-02-24 RX ADMIN — SODIUM CHLORIDE SCH MLS/HR: 9 INJECTION, SOLUTION INTRAVENOUS at 18:24

## 2023-02-24 RX ADMIN — SODIUM CHLORIDE SCH MLS/HR: 9 INJECTION, SOLUTION INTRAVENOUS at 13:13

## 2023-02-24 RX ADMIN — ATORVASTATIN CALCIUM SCH MG: 20 TABLET, FILM COATED ORAL at 20:45

## 2023-02-24 RX ADMIN — Medication SCH DEV: at 12:26

## 2023-02-24 RX ADMIN — SODIUM CHLORIDE SCH MLS/HR: 9 INJECTION, SOLUTION INTRAVENOUS at 23:35

## 2023-02-24 NOTE — NUR
NURSE REPORT  



REPORT GIVEN TO DAYSHIFT NURSE KARL TO ASSUME CARE OF PATIENT. ALL QUESTIONS ANSWERED.



GAGANDEEP SIGALA RN

## 2023-02-24 NOTE — NUR
NURSE REPORT



REPORT OBTAINED FROM [facility] NURSE PHYLLIS AND THIS NURSE ASSUMED CARE OF PATIENT. VSS. 
AFEB. NO C/O PAIN OR DISCOMFORT. BG LAST DONE AT 1900- 82. DAUGHTER AT BEDSIDE AND ASKED WHY 
BG BEING DONE AGAIN, SINCE JUST DONE. BG IS Q4H. EXPLAIN WITH DO LIKE NEAR MN AND 4 AM.

## 2023-02-24 NOTE — NUR
SCHEDULED IV ERYTHROMYCIN AND PRN MAGNESIUM IV WAS GIVEN BY DEA HAUSER FOR MAGNESIUM 1.7. 
TOLERATING WELL.

## 2023-02-24 NOTE — NUR
RECEIVED REPORT FROM NIGHT NURSE GAGANDEEP FOR CONTINUITY OF CARE. INITIAL ASSESSMENT DONE. 
PICC LINE INTACT TO VIJAY. COLOSTOMY BAG INTACT WITH SMALL AMOUNT OF LOOSE STOOL. NO C/O PAIN 
OF DISCOMFORT. CALL LIGHT KEPT WITHIN REACH. WILL CONTINUE TO MONITOR.

## 2023-02-24 NOTE — NUR
RECEIVED CALLED FROM PHARMACY SPOKE TO JOSSY REGARDING ODER FOR TPN. CLARIFIED WITH DR. LOBO, NO NEED FOR TPN.

## 2023-02-25 VITALS — SYSTOLIC BLOOD PRESSURE: 106 MMHG | DIASTOLIC BLOOD PRESSURE: 52 MMHG

## 2023-02-25 VITALS — SYSTOLIC BLOOD PRESSURE: 95 MMHG | DIASTOLIC BLOOD PRESSURE: 30 MMHG

## 2023-02-25 LAB
ANION GAP SERPL CALCULATED.3IONS-SCNC: 6.3 MMOL/L (ref 8–16)
BUN SERPL-MCNC: 6 MG/DL (ref 7–18)
CHLORIDE SERPL-SCNC: 106 MMOL/L (ref 98–107)
CO2 SERPL-SCNC: 30.1 MMOL/L (ref 21–32)
CREAT SERPL-MCNC: 0.5 MG/DL (ref 0.6–1.3)
GFR SERPL CREATININE-BSD FRML MDRD: (no result) ML/MIN (ref 90–?)
GLUCOSE SERPL-MCNC: 87 MG/DL (ref 74–106)
MAGNESIUM SERPL-MCNC: 1.8 MG/DL (ref 1.8–2.4)
PHOSPHATE SERPL-MCNC: 3.8 MG/DL (ref 2.5–4.9)
POTASSIUM SERPL-SCNC: 4.4 MMOL/L (ref 3.5–5.1)
SODIUM SERPL-SCNC: 138 MMOL/L (ref 136–145)

## 2023-02-25 RX ADMIN — SODIUM CHLORIDE SCH MLS/HR: 9 INJECTION, SOLUTION INTRAVENOUS at 13:05

## 2023-02-25 RX ADMIN — FUROSEMIDE SCH MG: 20 TABLET ORAL at 10:43

## 2023-02-25 RX ADMIN — Medication SCH DEV: at 04:37

## 2023-02-25 RX ADMIN — Medication SCH DEV: at 12:06

## 2023-02-25 RX ADMIN — SODIUM CHLORIDE SCH MLS/HR: 9 INJECTION, SOLUTION INTRAVENOUS at 05:41

## 2023-02-25 RX ADMIN — OXYCODONE HYDROCHLORIDE AND ACETAMINOPHEN SCH MG: 500 TABLET ORAL at 10:33

## 2023-02-25 RX ADMIN — Medication SCH DEV: at 08:17

## 2023-02-25 NOTE — NUR
RECEIVED REPORT FROM NIGHT NURSE GAGANDEEP FOR CONTINUITY OF CARE. INITIAL ASSESSMENT DONE. 
IVF INFUSING WELL. COLOSTOMY BAG INTACT WITH SMALL AMOUNT OF LOOSE STOOL. NOT IN ANY 
DISTRESS NOTED. CALL LIGHT KEPT WITHIN REACH. WILL CONTINUE TO MONITOR.

## 2023-02-25 NOTE — NUR
PT LEFT, DISCHARGE TO HOME. TRANSPORTED BY PRIVATE CAR VIA WHEELCHAIR. ALERT AND VERBALLY 
RESPONSIVE. ID BAND AND IV REMOVED. DISCHARGE PAPERWORKS SIGNED BY PATIENT. PERSONAL 
BELONGINGS TAKEN. REMAINS STABLE.

## 2023-02-25 NOTE — NUR
NURSE REPORT



REPORT GIVEN TO DAYSHIFT NURSE PHYLLIS TO ASSUME CARE OF PATIENT. ALL QUESTIONS ANSWERED.



GAGANDEEP SIGALA RN